# Patient Record
Sex: FEMALE | Race: WHITE | NOT HISPANIC OR LATINO | Employment: UNEMPLOYED | URBAN - METROPOLITAN AREA
[De-identification: names, ages, dates, MRNs, and addresses within clinical notes are randomized per-mention and may not be internally consistent; named-entity substitution may affect disease eponyms.]

---

## 2017-03-23 ENCOUNTER — HISTORICAL (OUTPATIENT)
Dept: RADIOLOGY | Facility: HOSPITAL | Age: 62
End: 2017-03-23

## 2017-06-14 ENCOUNTER — HISTORICAL (OUTPATIENT)
Dept: SPEECH THERAPY | Facility: HOSPITAL | Age: 62
End: 2017-06-14

## 2017-07-19 ENCOUNTER — HISTORICAL (OUTPATIENT)
Dept: SPEECH THERAPY | Facility: HOSPITAL | Age: 62
End: 2017-07-19

## 2017-09-12 ENCOUNTER — HISTORICAL (OUTPATIENT)
Dept: SPEECH THERAPY | Facility: HOSPITAL | Age: 62
End: 2017-09-12

## 2017-09-18 ENCOUNTER — HISTORICAL (OUTPATIENT)
Dept: ADMINISTRATIVE | Facility: HOSPITAL | Age: 62
End: 2017-09-18

## 2017-09-18 LAB
BUN SERPL-MCNC: 19 MG/DL (ref 7–18)
CALCIUM SERPL-MCNC: 9.8 MG/DL (ref 8.5–10.1)
CHLORIDE SERPL-SCNC: 103 MMOL/L (ref 98–107)
CO2 SERPL-SCNC: 33 MMOL/L (ref 21–32)
CREAT SERPL-MCNC: 0.6 MG/DL (ref 0.6–1.3)
ERYTHROCYTE [DISTWIDTH] IN BLOOD BY AUTOMATED COUNT: 13.6 % (ref 11.5–14.5)
GLUCOSE SERPL-MCNC: 93 MG/DL (ref 74–106)
HCT VFR BLD AUTO: 39.3 % (ref 35–46)
HGB BLD-MCNC: 12.4 GM/DL (ref 12–16)
MCH RBC QN AUTO: 28.1 PG (ref 26–34)
MCHC RBC AUTO-ENTMCNC: 31.6 GM/DL (ref 31–37)
MCV RBC AUTO: 89.1 FL (ref 80–100)
PLATELET # BLD AUTO: 257 X10(3)/MCL (ref 130–400)
PMV BLD AUTO: 12.5 FL (ref 7.4–10.4)
POTASSIUM SERPL-SCNC: 4.5 MMOL/L (ref 3.5–5.1)
RBC # BLD AUTO: 4.41 X10(6)/MCL (ref 4–5.2)
SODIUM SERPL-SCNC: 141 MMOL/L (ref 136–145)
T4 FREE SERPL-MCNC: 0.72 NG/DL (ref 0.76–1.46)
TSH SERPL-ACNC: 37 MIU/L (ref 0.36–3.74)
WBC # SPEC AUTO: 4.7 X10(3)/MCL (ref 4.5–11)

## 2017-09-25 ENCOUNTER — HISTORICAL (OUTPATIENT)
Dept: SURGERY | Facility: HOSPITAL | Age: 62
End: 2017-09-25

## 2017-10-24 ENCOUNTER — HISTORICAL (OUTPATIENT)
Dept: SPEECH THERAPY | Facility: HOSPITAL | Age: 62
End: 2017-10-24

## 2017-10-30 ENCOUNTER — HISTORICAL (OUTPATIENT)
Dept: ADMINISTRATIVE | Facility: HOSPITAL | Age: 62
End: 2017-10-30

## 2017-11-02 ENCOUNTER — HISTORICAL (OUTPATIENT)
Dept: LAB | Facility: HOSPITAL | Age: 62
End: 2017-11-02

## 2017-11-02 LAB
T3FREE SERPL-MCNC: 1.5 PG/ML (ref 2.18–3.98)
T4 FREE SERPL-MCNC: 0.57 NG/DL (ref 0.76–1.46)
TSH SERPL-ACNC: 90.7 MIU/L (ref 0.36–3.74)

## 2017-12-11 ENCOUNTER — HISTORICAL (OUTPATIENT)
Dept: SPEECH THERAPY | Facility: HOSPITAL | Age: 62
End: 2017-12-11

## 2017-12-15 ENCOUNTER — HISTORICAL (OUTPATIENT)
Dept: OTOLARYNGOLOGY | Facility: CLINIC | Age: 62
End: 2017-12-15

## 2017-12-15 LAB
T4 FREE SERPL-MCNC: 0.98 NG/DL (ref 0.76–1.46)
TSH SERPL-ACNC: 14.9 MIU/L (ref 0.36–3.74)

## 2018-01-03 ENCOUNTER — HISTORICAL (OUTPATIENT)
Dept: SPEECH THERAPY | Facility: HOSPITAL | Age: 63
End: 2018-01-03

## 2018-04-10 ENCOUNTER — HISTORICAL (OUTPATIENT)
Dept: ADMINISTRATIVE | Facility: HOSPITAL | Age: 63
End: 2018-04-10

## 2018-04-10 LAB
T4 FREE SERPL-MCNC: 0.86 NG/DL (ref 0.76–1.46)
TSH SERPL-ACNC: 18.9 MIU/L (ref 0.36–3.74)

## 2018-09-14 ENCOUNTER — HISTORICAL (OUTPATIENT)
Dept: ADMINISTRATIVE | Facility: HOSPITAL | Age: 63
End: 2018-09-14

## 2018-09-14 LAB
ALBUMIN SERPL-MCNC: 3.8 GM/DL (ref 3.4–5)
ALBUMIN/GLOB SERPL: 1 RATIO (ref 1–2)
ALP SERPL-CCNC: 86 UNIT/L (ref 45–117)
ALT SERPL-CCNC: 20 UNIT/L (ref 12–78)
AST SERPL-CCNC: 15 UNIT/L (ref 15–37)
BILIRUB SERPL-MCNC: 0.4 MG/DL (ref 0.2–1)
BILIRUBIN DIRECT+TOT PNL SERPL-MCNC: <0.1 MG/DL
BILIRUBIN DIRECT+TOT PNL SERPL-MCNC: ABNORMAL MG/DL
BUN SERPL-MCNC: 16 MG/DL (ref 7–18)
CALCIUM SERPL-MCNC: 8.8 MG/DL (ref 8.5–10.1)
CHLORIDE SERPL-SCNC: 104 MMOL/L (ref 98–107)
CO2 SERPL-SCNC: 34 MMOL/L (ref 21–32)
CREAT SERPL-MCNC: 0.6 MG/DL (ref 0.6–1.3)
ERYTHROCYTE [DISTWIDTH] IN BLOOD BY AUTOMATED COUNT: 12.4 % (ref 11.5–14.5)
GLOBULIN SER-MCNC: 4.7 GM/ML (ref 2.3–3.5)
GLUCOSE SERPL-MCNC: 87 MG/DL (ref 74–106)
HCT VFR BLD AUTO: 40.6 % (ref 35–46)
HGB BLD-MCNC: 13 GM/DL (ref 12–16)
MCH RBC QN AUTO: 30.2 PG (ref 26–34)
MCHC RBC AUTO-ENTMCNC: 32 GM/DL (ref 31–37)
MCV RBC AUTO: 94.2 FL (ref 80–100)
PLATELET # BLD AUTO: 255 X10(3)/MCL (ref 130–400)
PMV BLD AUTO: 12.2 FL (ref 7.4–10.4)
POTASSIUM SERPL-SCNC: 4.1 MMOL/L (ref 3.5–5.1)
PROT SERPL-MCNC: 8.5 GM/DL (ref 6.4–8.2)
RBC # BLD AUTO: 4.31 X10(6)/MCL (ref 4–5.2)
SODIUM SERPL-SCNC: 142 MMOL/L (ref 136–145)
T4 FREE SERPL-MCNC: 0.99 NG/DL (ref 0.76–1.46)
TSH SERPL-ACNC: 4.55 MIU/L (ref 0.36–3.74)
WBC # SPEC AUTO: 7.8 X10(3)/MCL (ref 4.5–11)

## 2018-09-21 ENCOUNTER — HISTORICAL (OUTPATIENT)
Dept: SURGERY | Facility: HOSPITAL | Age: 63
End: 2018-09-21

## 2018-11-20 ENCOUNTER — HISTORICAL (OUTPATIENT)
Dept: ADMINISTRATIVE | Facility: HOSPITAL | Age: 63
End: 2018-11-20

## 2018-11-20 LAB
T4 FREE SERPL-MCNC: 1.25 NG/DL (ref 0.76–1.46)
TSH SERPL-ACNC: 1.13 MIU/L (ref 0.36–3.74)

## 2019-09-24 ENCOUNTER — HISTORICAL (OUTPATIENT)
Dept: ADMINISTRATIVE | Facility: HOSPITAL | Age: 64
End: 2019-09-24

## 2019-09-24 LAB
ALBUMIN SERPL-MCNC: 3.7 GM/DL (ref 3.4–5)
ALBUMIN/GLOB SERPL: 0.8 RATIO (ref 1.1–2)
ALP SERPL-CCNC: 89 UNIT/L (ref 45–117)
ALT SERPL-CCNC: 20 UNIT/L (ref 12–78)
AST SERPL-CCNC: 12 UNIT/L (ref 15–37)
BILIRUB SERPL-MCNC: 0.6 MG/DL (ref 0.2–1)
BILIRUBIN DIRECT+TOT PNL SERPL-MCNC: 0.1 MG/DL (ref 0–0.2)
BILIRUBIN DIRECT+TOT PNL SERPL-MCNC: 0.5 MG/DL
BUN SERPL-MCNC: 16 MG/DL (ref 7–18)
CALCIUM SERPL-MCNC: 9.2 MG/DL (ref 8.5–10.1)
CHLORIDE SERPL-SCNC: 104 MMOL/L (ref 98–107)
CO2 SERPL-SCNC: 32 MMOL/L (ref 21–32)
CREAT SERPL-MCNC: 0.6 MG/DL (ref 0.6–1.3)
GLOBULIN SER-MCNC: 4.4 GM/ML (ref 2.3–3.5)
GLUCOSE SERPL-MCNC: 82 MG/DL (ref 74–106)
POTASSIUM SERPL-SCNC: 4 MMOL/L (ref 3.5–5.1)
PROT SERPL-MCNC: 8.1 GM/DL (ref 6.4–8.2)
SODIUM SERPL-SCNC: 140 MMOL/L (ref 136–145)
T4 FREE SERPL-MCNC: 1.29 NG/DL (ref 0.76–1.46)
TSH SERPL-ACNC: 0.02 MIU/L (ref 0.36–3.74)

## 2020-02-27 ENCOUNTER — HISTORICAL (OUTPATIENT)
Dept: OTOLARYNGOLOGY | Facility: CLINIC | Age: 65
End: 2020-02-27

## 2020-02-27 LAB
T4 FREE SERPL-MCNC: 1.25 NG/DL (ref 0.76–1.46)
TSH SERPL-ACNC: 0.05 MIU/L (ref 0.36–3.74)

## 2020-09-08 ENCOUNTER — HISTORICAL (OUTPATIENT)
Dept: ADMINISTRATIVE | Facility: HOSPITAL | Age: 65
End: 2020-09-08

## 2020-12-08 ENCOUNTER — HISTORICAL (OUTPATIENT)
Dept: ADMINISTRATIVE | Facility: HOSPITAL | Age: 65
End: 2020-12-08

## 2020-12-08 LAB
T4 FREE SERPL-MCNC: 0.91 NG/DL (ref 0.7–1.48)
TSH SERPL-ACNC: 7.26 UIU/ML (ref 0.35–4.94)

## 2021-04-23 ENCOUNTER — HISTORICAL (OUTPATIENT)
Dept: RADIOLOGY | Facility: HOSPITAL | Age: 66
End: 2021-04-23

## 2021-04-23 LAB — CREAT SERPL-MCNC: 0.73 MG/DL (ref 0.55–1.02)

## 2021-08-05 ENCOUNTER — HISTORICAL (OUTPATIENT)
Dept: ADMINISTRATIVE | Facility: HOSPITAL | Age: 66
End: 2021-08-05

## 2021-08-05 LAB
T4 FREE SERPL-MCNC: 1.07 NG/DL (ref 0.7–1.48)
TSH SERPL-ACNC: 0.14 UIU/ML (ref 0.35–4.94)

## 2022-04-01 ENCOUNTER — HISTORICAL (OUTPATIENT)
Dept: ADMINISTRATIVE | Facility: HOSPITAL | Age: 67
End: 2022-04-01

## 2022-04-01 ENCOUNTER — HISTORICAL (OUTPATIENT)
Dept: RADIOLOGY | Facility: HOSPITAL | Age: 67
End: 2022-04-01

## 2022-04-01 LAB — CREAT SERPL-MCNC: 0.75 MG/DL (ref 0.55–1.02)

## 2022-04-10 ENCOUNTER — HISTORICAL (OUTPATIENT)
Dept: ADMINISTRATIVE | Facility: HOSPITAL | Age: 67
End: 2022-04-10
Payer: MEDICAID

## 2022-04-28 VITALS
SYSTOLIC BLOOD PRESSURE: 111 MMHG | DIASTOLIC BLOOD PRESSURE: 70 MMHG | BODY MASS INDEX: 18 KG/M2 | WEIGHT: 112 LBS | HEIGHT: 66 IN | OXYGEN SATURATION: 100 %

## 2022-04-30 NOTE — OP NOTE
Date of surgery: 9/25/17    Pre-Op Diagnosis: left true vocal cord paralysis  Post-Op Diagnosis: same    Procedure: Direct Suspension Microlaryngoscopy, Left True Vocal Cord Injection Laryngoplasty    Anesthesia: General Endotracheal    Surgeon: Luis Johnson MD  Assistants: None  Attending: Ivett James MD    Indications for procedure: [id number] of the oral tongue s/p CRT 9/2012. With FOM recurrence s/p excision of floor of mouth, neck dissection, pec flap, with sacrifice of carotid artery and lower cranial nerves, including X, XI, and XII on 5/15/2017. She had reconstruction of the carotid with carotid to vertebral bypass with Dr. Berrios. Found to have left TVF paralysis on exam. After risks, benefits and alternatives to surgery were explained the patient elected to proceed with surgery. Informed consent was obtained in clinic and reviewed on the day of surgery.    Findings: Lateralized true vocal fold prior to injection; mildly-moderately medialized after injection    Procedure in Detail:   After informed consent was obtained and verified, the patient was brought to the operating suite and placed on the table in supine position. After adequate general anesthesia was achieved with endotracheal intubation with a laser-safe endotracheal tube, time out was performed. Patient was prepped and draped in clean fashion. Head of bed was rotated 90 degrees. Three folded damp gauzes were placed to protect upper alveolus. The Dedo laryngoscope was used to examine the upper aerodigestive tract. Oropharynx appeared within normal limits. The epiglottis and tongue base were examined and within normal limits. The scope was maneuvered to visualize the supraglottis and glottis, and the patient was placed into suspension on the Mustard stand. The left true vocal cord was visualized with the assistance of the 0- and 30-degree telescope, found to be in a lateralized position. Using the 0-degree telescope to visualized the  larynx, the Prolaryn voice gel was injected into the left vocal ligament near the posterior aspect of the vocalis muscle, with care to stay deep to the lamina propria. Medialization of the true vocal cord was visualized.     Photodocumentation was obtained of the following findings: glottis before and after injection    The alveolar gauze was removed and dentition was inspected and noted to be stable. All instruments were removed from the patient, all counts were correct, and the patient was returned to the care of anesthesia for reversal in the operating room and sent to recovery room in satisfactory condition. No identifiable bleeding at the termination of procedure. The patient was returned to anesthesia for reversal and sent to recovery room in good condition. All counts were correct.     EBL: 00 cc    Specimens: none    Drains: None    Complications: None

## 2022-05-04 NOTE — HISTORICAL [facility] CERNER
This is a historical note converted from [place]. Formatting and pictures may have been removed.  Please reference [facility] for original formatting and attached multimedia. Chief Complaint  F/U APPT CANCER OF ORAL CAVITY; S/P LT RADICAL NECK DISSECTION  History of Present Illness  58y with [id number] scca OC s/p XRT (Oct 2012) and was not interested in large resection at that time.  doing ok, she is tolerating PO liquids and drinks 6-8 cans of ensure per day. Her weight has been stable at 113-116lbs since July. She is concerned with the appearance of her teeth and has discussed dental extractions with her radiation oncologist but has had no tooth pain, swelling, or bleeding. She notes no new lumps or bumps, no tongue or oral cavity pain, ulceration, or bleeding.  ????????  July 29, 2014: Still tolerating PO. Gained weight since last visit. Drinking ensure, ice cream, and soup. No dyspnea. No new lumps/bumps  ????????  9/16/14: Still tolerating PO.  ????????  October 28, 2014: Still tolerating liquids PO. No complaints. Weight is stable. Wants residual mandibular dentiition extracted but is going to talk to radiation oncologist first. Discussed the risk of ORN with her and she understands the need for pre and post procedure hyperbaric oxygen. She will contact us if wanting to proceed with extractions.  ?   03/31/2015: Reports?2 weeks of left mandibular pain.? Otherwise, tolerating ensure, weight stable, no new lesions to neck.  ?   4-14-15: Here s/p biopsy in clinic. Feeling less pain than before bx. No bleeding.  Never got CT done bc not called.  ?   4-30-15: Here for CT results-- shows submandibular masses c/w metastatic disease.  Long talk with pt about next steps to take... she is not interested in pursuing anything at this time.  No changes in swallowing, speech. I feel fine.  ?   5/28/15: Returns today in follow up. Has history of [id number] SCCa or the oral cavity and underwent XRT as she refused surgery. Finished in  October of 2012. Had been following up with Dr. Ramirez. Recently noted to have bilateral submadibular nodes. CT concerning for residual disease or mets. She has been refusing any further intervention.  Has been very depressed lately as sister passed away. She admits some denial and is ready to move on with FNA. She otherwise reports no new issues.  ?  6/18/15: Returns today in follow up. Had FNA with Dr. Starr last week and here to discuss results. Reports no new significant issues.  7/2/15: feeling ok. Has a lot of questions about her disease.  7/14/15: s/p CT head,?neck, chest, abdomen. No new issues.  7/28/15: Here today for follow up. She has consultation with Dr. Heard regarding biopsy of lung versus hip lesion on Friday. No other issues.  8/11:15: No new issues, no new lumps/bumps. States she notices her neck lymphadenopathy more now - is a source of discomfort. Otherwise, USOH.  9/3/15: no new issues. floor of mouth still sore after biopsies. States her neck LAD is about the same size as last visit.  ?  04/13/2017: Since last visit in 2015 (at which time +biopsy of FOM,?B LAD), patient has refused any treatment options. Did not pursue treatment with chemo or radiation. Now, with severe left neck pain radiating to face. No dysphagia, odynophagia, voice changes. New larger left neck mass.  ?  6/1/17: Had advanced T4 squamous cell carcinoma in the distant past and refused surgery, instead getting XRT. Presented with persistent disease and most recently underwent excision of floor of mouth, neck dissection, pec flap, with sacrifice of carotid artery and lower cranial nerves, including X, XI, and XII on 5/15/2017. She had reconstruction of the carotid with carotid to vertebral bypass with Dr. Berrios. She is here for her first postoperative visit.  ?  06/08/2017: Complains of PEG ache, worse for 5 days. Using PEG without difficulty. Not performing wound care to neck  ?  6/15/17: Doing well. Performing  WtD dressing changes. Healing well. No drainage. Worried that she irritated her SG donor site. Using PEG and supplementation. Has gained 2-lbs since last visit.  ?  7/6/17: Doing well. Neck wound has almost completely healed. ?Planning for additional XRT with Dr. Ramirez. Tolerating PEG feeds. Pain controlled. No new lumps/bumps.  ?  8/10/17: Doing well. Has 6 XRT treatments left, started 7/10. No issues. Weight stable. Seeing Shonda.  ?  9/12/17: Here for follow up of [id number] of the oral tongue s/p CRT 9/2012. With FOM recurrence s/p excision of floor of mouth, neck dissection, pec flap, with sacrifice of carotid artery and lower cranial nerves, including X, XI, and XII on 5/15/2017. She had reconstruction of the carotid with carotid to vertebral bypass with Dr. Berrios. Finished XRT to neck 8/21/17. No new lesions/masses. Weight is down but seeing nutrition. Taking all intake in PEG. Weight 92 lbs down from 101. Nutrition appt Thursday  Review of Systems  Constitutional_negative  Eye_no vision changes  ENMT see HPI  Respiratory negative  Cardiovascular negative  Gastrointestinal negative  Hema/Lymph_negative  Endocrine negative  Immunologic negative  Musculoskeletal negative  Integumentary negative  Neurologic negative  All Other ROS_ negative?  ?  Physical Exam  ???Vitals & Measurements  ??T:?97.2? ?F ?(Oral)? HR:?81?(Peripheral)? RR:?20? BP:?129/79?  ??HT:?168?cm? HT:?168?cm? WT:?42?kg? WT:?42?kg? BMI:?14.88?  General - no?acute distress, alert/oriented, very thin and cachectic appearing?(BMI 14)  Voice -?moderate dysphonia  Eye - extraocular movements intact bilaterally, pupils equal round and reactive to light and accommodation  Head - normocephalic, atraumatic  Ears - AD: normal external ear, EAC normal, TM normal, no middle ear effusion, no retraction  ?????????AS: normal external ear, EAC normal, TM normal, no middle ear effusion, no retraction  ?????????Grossly normal hearing  Nose - bilateral nares  patent, no nasal septal deviation, no inferior turbinate hypertrophy, no masses/lesions apparent  Oral cavity/oropharynx - mucosal membranes moist, tonsils within normal limits, floor of mouth soft and nontender, STSG well healed, no lesions or masses, tongue with minimal mobility, XRT changes  Neck - supple, nontender, no cervical?lymphadenopathy, normal range of motion, significant XRT fibrosis, pec flap site well healed  Cardiovascular - regular rate, normal rhythm; carotid pulses palpable bilaterally  Musculoskeletal - no peripheral weakness  Respiratory - nonlabored respirations  Integumentary - no obvious skin lesions, right thigh and groin lesions well healed  Neurologic - cranial nerves II to XII grossly intact bilaterally?  ?  Flexible Laryngoscopy: Topical tetracaine and phenylephrine applied to nasal cavities. Scope entered in right nare. No masses or lesions in the nasal cavity or nasopharynx. BOT/vallecula with normal lingual tonsils, no mass or lesion, no asymmetry. Epiglottis, AE folds, false vocal cords, arytenoids with no lesions or mass. Interarytenoid area without significant edema and erythema. Post cricoid region/piriforms with no mass or lesion. True vocal cords- right with normal mobility, left in paramedian position, +glottic gap  Assessment/Plan  ?  [id number] of the oral tongue s/p CRT 9/2012. With FOM recurrence s/p excision of floor of mouth, neck dissection, pec flap, with sacrifice of carotid artery and lower cranial nerves, including X, XI, and XII on 5/15/2017. She had reconstruction of the carotid with carotid to vertebral bypass with Dr. Berrios.  ?  Interested in L TVC injection. Will plan on 9/25/17 assuming it is ok for her to stop ASA prior to procedure. Will get general medical clearance  Continue follow up with nutrition  Continue follow up with ST  NPO, continue PEG tube feeds  Continue follow up with XRT as scheduled  RTC in 4 weeks Problem List/Past Medical  History  ??Ongoing  ??Anxiety  ??Head and neck cancer  ??Hx-oral/pharynx malg NEC(  Confirmed  )  ??Leg cramps  ??Lymphadenopathy, submandibular  ??Sleep difficulties  ??Tobacco user(  Probable Diagnosis  )  ?Historical  ???Oral cancer  ???Tongue cancer  Procedure/Surgical History  ??Bronchoscopy, Diagnostic (08/24/2015)  ??Bronchoscopy, rigid or flexible, including fluoroscopic guidance, when performed; diagnostic, with cell washing, when performed (separate procedure) (08/24/2015)  ??Closed [needle] biopsy of tongue (08/24/2015)  ??Laryngoscopy (None) (08/24/2015)  ??Laryngoscopy, direct, operative, with biopsy; (08/24/2015)  ??Other bronchoscopy (08/24/2015)  ??Radical neck dissection  Medications  ??aspirin 81 mg oral tablet, 81 mg= 1 tab(s), Oral, Daily  ??Fish Oil 1200 mg oral capsule, 1200 mg= 1 cap(s), Oral, TID,? ?Not taking  ??gabapentin 300 mg oral capsule, See Instructions,? ?Not taking  ??Hycet 7.5 mg-325 mg/15 mL oral solution, 15 mL, Oral, q6hr, PRN,? ?Not taking  ??levothyroxine 25 mcg (0.025 mg) oral tablet, 25 mcg= 1 tab(s), Oral, Daily, 3 refills  ??Lexapro 10 mg oral tablet, 10 mg= 1 tab(s), Oral, Daily,? ?Not taking  ??omeprazole 20 mg oral DR capsule, 20 mg= 1 cap(s), Oral, Daily, 6 refills  Allergies  No Known Allergies  Social History  ??Alcohol - 09/22/2015  ???Current  ???Current  ??Employment/School - No Risk, 09/22/2015  ???disability  ??Exercise - Regular exercise, 09/22/2015  ???Exercise frequency: Daily.  ??Home/Environment - No Risk, 09/22/2015  ???Lives with Alone. Living situation: Home/Independent.  ??Nutrition/Health - Low Risk, 09/22/2015  ???Ensure  ??Sexual - No Sexual Activity, 09/22/2015  ??Substance Abuse - No Risk, 09/22/2015  ???Past  ??Tobacco - 09/22/2015  ???Former smoker, Cigarettes  ???Current every day smoker  Family History  ??Alcoholism.: Sister.   ?  [1]?ENT Clinic Note; Nas FRANCISCO, Joyce KIMBLE 08/10/2017 15:49 CDT  Addendum by Erika FRANCISCO, Ivett Brar on  September 12, 2017 13:46:45 CDT (Verified)  The patients history, past history and exam were discussed with the resident while the patient was in clinic and I agree with the proposed assessment and management plan. [1]  ?  ?  9/25/17: Here for surgery. No changes to H&P above. Stopped ASA for last 5 days.  [1]?Office Visit Note; Venkatesh FRANCISCO, Pallavi KERN 09/12/2017 11:46 CDT

## 2022-05-04 NOTE — HISTORICAL [facility] CERNER
This is a historical note converted from [place]. Formatting and pictures may have been removed.  Please reference [place] for original formatting and attached multimedia. 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  doing ok, she is tolerating PO liquids and drinks 6-8 cans of ensure per day. Her weight has been stable at 113-116lbs since July. She is concerned with the appearance of her teeth and has discussed dental extractions with her radiation oncologist but has had no tooth pain, swelling, or bleeding. She notes no new lumps or bumps, no tongue or oral cavity pain, ulceration, or bleeding.  ????????  July 29, 2014: Still tolerating PO. Gained weight since last visit. Drinking ensure, ice cream, and soup. No dyspnea. No new lumps/bumps  ????????  9/16/14: Still tolerating PO.  ????????  October 28, 2014: Still tolerating liquids PO. No complaints. Weight is stable. Wants residual mandibular dentiition extracted but is going to talk to radiation oncologist first. Discussed the risk of ORN with her and she understands the need for pre and post procedure hyperbaric oxygen. She will contact us if wanting to proceed with extractions.  ?   03/31/2015: Reports?2 weeks of left mandibular pain.? Otherwise, tolerating ensure, weight stable, no new lesions to neck.  ?   4-14-15: Here s/p biopsy in clinic. Feeling less pain than before bx. No bleeding.  Never got CT done bc not called.  ?   4-30-15: Here for CT results-- shows submandibular masses c/w metastatic disease.  Long talk with pt about next steps to take... she is not interested in pursuing anything at this time.  No changes in swallowing, speech. I feel fine.  ?   5/28/15: Returns today in follow up. Has history of [id number] SCCa or the oral cavity and underwent XRT as she refused surgery. Finished in October of 2012. Had been following  up with Dr. Ramirez. Recently noted to have bilateral submadibular nodes. CT concerning for residual disease or mets. She has been refusing any further intervention.  Has been very depressed lately as sister passed away. She admits some denial and is ready to move on with FNA. She otherwise reports no new issues.  ?   6/18/15: Returns today in follow up. Had FNA with Dr. Starr last week and here to discuss results. Reports no new significant issues.  7/2/15: feeling ok. Has a lot of questions about her disease.  7/14/15: s/p CT head,?neck, chest, abdomen. No new issues.  7/28/15: Here today for follow up. She has consultation with Dr. Heard regarding biopsy of lung versus hip lesion on Friday. No other issues.  8/11:15: No new issues, no new lumps/bumps. States she notices her neck lymphadenopathy more now - is a source of discomfort. Otherwise, USOH.  9/3/15: no new issues. floor of mouth still sore after biopsies. States her neck LAD is about the same size as last visit.  ?   04/13/2017: Since last visit in 2015 (at which time +biopsy of FOM,?B LAD), patient has refused any treatment options. Did not pursue treatment with chemo or radiation. Now, with severe left neck pain radiating to face. No dysphagia, odynophagia, voice changes. New larger left neck mass.  ?   6/1/17: Had advanced T4 squamous cell carcinoma in the distant past and refused surgery, instead getting XRT. Presented with persistent disease and most recently underwent excision of floor of mouth, neck dissection, pec flap, with sacrifice of carotid artery and lower cranial nerves, including X, XI, and XII on 5/15/2017. She had reconstruction of the carotid with carotid to vertebral bypass with Dr. Berrios. She is here for her first postoperative visit.  ?  06/08/2017: Complains of PEG ache, worse for 5 days. Using PEG without difficulty. Not performing wound care to neck  ?  6/15/17: Doing well. Performing WtD dressing changes. Healing well.  No drainage. Worried that she irritated her SG donor site. Using PEG and supplementation. Has gained 2-lbs since last visit.  ?  7/6/17: Doing well. Neck wound has almost completely healed. ?Planning for additional XRT with Dr. Ramirez. Tolerating PEG feeds. Pain controlled. No new lumps/bumps.  ?  8/10/17: Doing well. Has 6 XRT treatments left, started 7/10. No issues. Weight stable. Seeing Shonda.  ?  9/12/17: Here for follow up of [id number] of the oral tongue s/p CRT 9/2012. With FOM recurrence s/p excision of floor of mouth, neck dissection, pec flap, with sacrifice of carotid artery and lower cranial nerves, including X, XI, and XII on 5/15/2017. She had reconstruction of the carotid with carotid to vertebral bypass with Dr. Berrios. Finished XRT to neck 8/21/17. No new lesions/masses. Weight is down but seeing nutrition. Taking all intake in PEG. Weight 92 lbs down from 101. Nutrition appt Thursday [1]  ?  10/11/17: s/p L TVC injection laryngoplasty. patient states that she feels like her voice has had some improvment. still having problems with swallowing- states she is having difficulty initiating swallowing. Saw Shonda about 3 weeks ago she thinks and does not have another appointment.  weight stable. no lumps or bumps/changes in dysphagia/otalgia.  c/o central blurry vision in R eye which she noticed several days after surgery [1]  ?  11/8/17: having difficulty gaining weight, but not losing. working with nutritionist. seeing Shonda with ST; did not get esophagram 2/2 aspiration risk. Feels like she has had some improvement in voice, but it comes and goes. no new lumps or bumps. no otalgia/odynophagia.  ?  12/20/17: Here today for follow up. No complaints, keeping weight ok. Saw XRT recently - doing well from there perspective, next appt in 4 months.  Last visit TSH was 90, synthroid increased to 75 mcg, TSH now 14, FT4 is 0.98. Denies any new lumps or bumps, no pain. Swallowing slightly improved with ST  help at this time.  ?  1/31/18: Doing well. Denies otalgia, dyspnea, voice changes, new masses/lesions. Weight stable/increased (gained 8-lbs). PEG dependent, working with ST?on voice/oral feeding-- passed honey thickened liquids in 9/2017. C/o pain at PEG site-- stabbing, sharp, constant. Seen by [facility] this month-- [facility]SU[facility].  ?  3/14/18: Here for routine surveillance. Feels good. No dysphagia, odynophagia, otalgia. No weight loss. No concerns for new lumps/bumps. Gaining weight. Working with ST. Doing 100% via PEG.  [1]  ?  4/10/18: No new complaints, Still PEG dependent, unable to take PO due to dysphagia.? No SOB.? Weight stable.? No new masses.? No fatigue.? No new pain.  ?  6/14/18: Here for routine surveillance. Feels good. No new dysphagia, odynophagia, otalgia. No weight loss. No concerns for new lumps/bumps. No PO intake, PEG dependant.  ?  7/26/18: Here for routine surveillance. Says she had left sided jaw pain for 2 weeks recently and then it just went away. No lesions or ulcers. Felt deeper and bone pain to her. Has been having issues with her left arm and shoulder since surgery, frustrated she is unable to throw with her great nieces. Has been trying swimming.  ?  9/6/18: Here today for cancer surveillance and to discuss VC injection on L. Patient is open to repeat injection to help voice/breathiness. No new complaints- denies otalgia, weight loss, new nodes. Still using PEG exclusively.  ?  9/21/18: Pt here for repeat VC injection.? No changes to health.  ?  10/9/18: Here today post-op from second L TVC injection. States she feels that her voice is better post-op with less breathiness. She is still using PEG tube, NPO. No new issues.  ?  11/20/18: Here for surveillance. Doing very well. No new issues. Feels her voice is stable. Still using PEG primarily. No new lumps/bumps. No oral lesions or bleeding  ?  1/2/19: Here for surveillance check. No ENT issues. Feels voice is stable. Has had some difficulty  with PEG tube (fell out around Thanksgiving and had to be replaced in ED). Has been loose and area around tube has been irritated. [1]  ?  2/14/19: No changes since last visit other than recent fever blister. PEG tube working well. Denies otalgia, dyspnea, voice changes, new masses/lesions. Weight stable. Needs refills on synthroid. [1]  ?  3/28/19: h/o [id number] SCC oral tongue s/p CRT 2012. Recurrent in FOM s/p WLE/ L MRND with sacrifice of carotid CN X, XI, XII, pmmf, vertebral to carotid bypass with Dr. Berrios.  using peg for all nutrition. asking if shell ever swallow again. has not seen dora in 1 year but states she is still doing exercises. no?voice changes. states shes had 2 injections and neither of them helped. denies otalgia/throat pain, new lesions or oral pain.  ?  5/30/19: Here for cancer surveillance. Still with significant dysphagia. PEG dependent. Denies dyspnea. Stable dysphonia.  ?  7/30/2019: Dysphonia stable. Denies any new issues. Dysphagia stable and has seen speech who gave her exercises. remains PEG dependent  ?  9/24/19: Here for surveillance. Has not gotten her yearly labs and chest xray - will order today. No new issues. Still hates her PEG but did not get any benefit with swallow form injections of cord so not interested in further intervention.  ?  12/19/19: No c/o today. Needs synthroid refill.  ?  2/27/20: No complaints today. No changes in voice, weight, lumps/bumps. Everything stable. Still hates PEG. Still not wanting any further vocal fold injections.  ?  6/9/20: Here today for follow-up. Voice stable, weight stable, no new otalgia, pain, lumps or bumps in the head or neck. Doing all intake via PEG. Not interested in further vocal fold injections at this time.?Doing well overall, no complaints. Needs refill on synthroid. [1]  ?  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 Patient had developed a recurrence and had undergone resection of the floor of mouth with wide local excision and reconstruction with a left pectoralis major myocutaneous flap in conjunction with a left modified radical neck dissection with sacrifice of the left carotid artery (vertebral bypass) and resection of cranial nerves X, XI, and XII in May 2017.? 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  Patient also is PEG dependent and takes all her nutrition through her PEG.? She also has hypothyroidism.? She has no complaints today.? She has no sore throat, difficulty breathing, or change in weight.? No complaints of any ear pain and no noticeable swelling of the neck.  Review of Systems  Unremarkable except as mentioned above.  Physical Exam  Vitals & Measurements  T:?36.4? ?C (Axillary)? HR:?80(Peripheral)? RR:?18? BP:?132/67?  HT:?162.00?cm? WT:?48.200?kg? BMI:?18.37?  General: Thin?female in no acute distress.? Voice is breathy.  Head and face: Normocephalic. ?No facial lesions. ?No temporomandibular joint tenderness or click.  Ears: Auricles are developed normally bilaterally. ?Both external auditory canals are clear. ?No evidence of otitis externa. ?No cerumen impaction. ?Tympanic membranes are nonerythematous. ?No middle ear effusions.  Nose: Nasal dorsum is unremarkable. ?She does have left septal deviation. ?Mild intranasal congestion with clear drainage.  Oral cavity and oropharynx:?The patient does not have any?recurrent lesions involving the?floor of mouth or tongue region.? Buccal mucosa is unremarkable. ?No lesions of the alveolar ridge?inferiorly. ?She does have an upper?denture in place. ?Palate is without lesions.? No posterior pharyngeal erythema or exudates. ?No masses.  Larynx and hypopharynx: Fiberoptic laryngoscopy was carried out in the clinic after  informed consent was obtained. ?Both nostrils were sprayed with 1% phenylephrine nasal spray and 4% tetracaine. ?Laryngoscope was then then introduced into the nasal cavity via the right nostril. ?Examination of the nasal cavity showed left septal deviation with mild intranasal congestion and clear secretions. ?Examination of the nasopharynx showed no nasopharyngeal masses. ?Eustachian tubes or unobstructed. ?Examination of the larynx and hypopharynx showed normal mobility involving the right true vocal cord. ?The?left true vocal cord is immobile and in a paramedian position. ?Upon phonation the patient does have incomplete glottic closure.? No hypopharyngeal or laryngeal lesions.? No masses or lesions in the area of the?base of tongue.  Neck: Supple without palpable?adenopathy. ?No thyromegaly. ?Trachea is in the midline. ?Patient does have?fibrotic changes in the neck secondary to her prior surgery and radiation.  Eyes: Extraocular muscles are intact bilaterally. ?No exophthalmos or enophthalmos.  Neurologic: Alert?and oriented x3.? Cranial nerves II through XII are intact except as?noted above.  ?  Lab:Her most recent thyroid function studies were from February 27, 2020; her TSH was low at 0.050 and her free T4 was normal at 1.25.? Her last chest x-ray was 1 year ago.  Assessment/Plan  [id number] squamous cell carcinoma of the oral cavity-no evident disease.  Iatrogenic left true vocal cord paralysis with hoarseness which had responded to vocal cord injections previously.  Hypothyroidism-overtreated.  ?  Plan:  Did discuss with the patient consideration of a thyroplasty for additional treatment of her hoarseness.? I did discuss with her that this would be a more permanent solution as compared to her vocal cord injections and she would like to give some consideration to this.? Referral to Dr. Anne.  Reduce levothyroxine to 75 mcg p.o. daily.  Order chest x-ray today.  Follow-up here in 3 months with pre-clinic TSH  and free T4.? Patient will need negative pressure room and will have laryngoscopy done at that time.   Problem List/Past Medical History  Ongoing  Dysphagia  Dysphagia  Head and neck cancer  Hx-oral/pharynx malg NEC(  Confirmed  )  Leg cramps  Lymphadenopathy, submandibular  Malignant tumor of anterior two-thirds of tongue  Morbid obesity  Oral cancer  Secondary malignant neoplasm of neck  Sleep difficulties  Tobacco user(  Probable Diagnosis  )  Tongue cancer  Vocal cord paralysis  Vocal cord paralysis  Historical  Anxiety  Procedure/Surgical History  Introduction of Other Therapeutic Substance into Respiratory Tract, Via Natural or Artificial Opening Endoscopic (09/21/2018)  Laryngoscopy, direct, with injection into vocal cord(s), therapeutic; with operating microscope or telescope (09/21/2018)  Microlaryngoscopy (None) (09/21/2018)  Introduction of Other Therapeutic Substance into Respiratory Tract, Via Natural or Artificial Opening Endoscopic (09/25/2017)  Laryngoscopy (None) (09/25/2017)  Laryngoscopy, direct, with injection into vocal cord(s), therapeutic; with operating microscope or telescope (09/25/2017)  Vocal Cord Injection (Left) (09/25/2017)  Bronchoscopy, Diagnostic (08/24/2015)  Bronchoscopy, rigid or flexible, including fluoroscopic guidance, when performed; diagnostic, with cell washing, when performed (separate procedure) (08/24/2015)  Closed [needle] biopsy of tongue (08/24/2015)  Laryngoscopy (None) (08/24/2015)  Laryngoscopy, direct, operative, with biopsy; (08/24/2015)  Other bronchoscopy (08/24/2015)  Radical neck dissection   Medications  acyclovir 400 mg oral tablet, 400 mg= 1 tab(s), Oral, TID,? ?Not Taking, Completed Rx  aspirin 81 mg oral tablet, 81 mg= 1 tab(s), Oral, Daily  Ensure Live, See Instructions, 12 refills  FLUARIX QUAD 3208-1455 SYRINGE,? ?Not taking  Jevity 1.2, See Instructions, 12 refills  levothyroxine 88 mcg (0.088 mg) oral tablet, 88 mcg= 1 tab(s), Oral, Daily, 2  refills  Allergies  No Known Allergies  Social History  Abuse/Neglect  No, No, Yes, 09/08/2020  Alcohol  Past, 02/27/2020  Employment/School - No Risk, 09/22/2015  disability, 02/28/2015  Exercise - Regular exercise, 09/22/2015  Exercise frequency: Daily., 02/28/2015  Home/Environment - No Risk, 09/22/2015  Lives with Alone. Living situation: Home/Independent., 02/28/2015  Nutrition/Health - Low Risk, 09/22/2015  Ensure, 02/28/2015  Sexual - No Sexual Activity, 02/28/2015  Sexually active: No., 12/19/2019  Sexually active: No. Sexual orientation: Lesbian, serra or homosexual. Gender Identity Identifies as female., 02/14/2019  Spiritual/Cultural  Spiritism, 09/24/2019  Substance Use - No Risk, 09/22/2015  Past, Marijuana, 01/03/2019  Tobacco  Former smoker, quit more than 30 days ago, Cigarettes, No, 09/08/2020  Family History  Alcoholism.: Sister.  Health Maintenance  Health Maintenance  ???Pending?(in the next year)  ??? ??OverDue  ??? ? ? ?Aspirin Therapy for CVD Prevention due??06/01/18??and every 1??year(s)  ??? ? ? ?Smoking Cessation due??01/01/20??and every 1??year(s)  ??? ? ? ?Alcohol Misuse Screening due??01/02/20??and every 1??year(s)  ??? ??Due?  ??? ? ? ?Breast Cancer Screening due??09/08/20??and every?  ??? ? ? ?Cervical Cancer Screening due??09/08/20??and every?  ??? ? ? ?Colorectal Screening due??09/08/20??and every?  ??? ? ? ?Influenza Vaccine due??09/08/20??and every?  ??? ? ? ?Lipid Screening due??09/08/20??and every?  ??? ? ? ?Lung Cancer Screening due??09/08/20??and every 1??year(s)  ??? ? ? ?Tetanus Vaccine due??09/08/20??and every 10??year(s)  ??? ? ? ?Zoster Vaccine due??09/08/20??and every?  ??? ??Due In Future?  ??? ? ? ?Depression Screening not due until??12/18/20??and every 1??year(s)  ??? ? ? ?Obesity Screening not due until??01/01/21??and every 1??year(s)  ??? ? ? ?ADL Screening not due until??02/27/21??and every 1??year(s)  ???Satisfied?(in the past 1 year)  ??? ??Satisfied?  ??? ? ? ?ADL  Screening on??02/27/20.??Satisfied by Giselle Mac LPN  ??? ? ? ?Blood Pressure Screening on??09/08/20.??Satisfied by Aleyda Austin LPN  ??? ? ? ?Body Mass Index Check on??09/08/20.??Satisfied by Aleyda Austin LPN  ??? ? ? ?Depression Screening on??12/19/19.??Satisfied by Kareen Birch LPN  ??? ? ? ?Diabetes Screening on??09/24/19.??Satisfied by Cortney Alegria  ??? ? ? ?Obesity Screening on??09/08/20.??Satisfied by Aleyda Austin LPN  ?     [1]?ENT Clinic Note; Lillie Rios MD 06/09/2020 10:49 CDT

## 2022-05-04 NOTE — HISTORICAL [facility] CERNER
This is a historical note converted from [place]. Formatting and pictures may have been removed.  Please reference [place] for original formatting and attached multimedia. Chief Complaint  F/U for head and neck Cancer.  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  doing ok, she is tolerating PO liquids and drinks 6-8 cans of ensure per day. Her weight has been stable at 113-116lbs since July. She is concerned with the appearance of her teeth and has discussed dental extractions with her radiation oncologist but has had no tooth pain, swelling, or bleeding. She notes no new lumps or bumps, no tongue or oral cavity pain, ulceration, or bleeding.  ????????  July 29, 2014: Still tolerating PO. Gained weight since last visit. Drinking ensure, ice cream, and soup. No dyspnea. No new lumps/bumps  ????????  9/16/14: Still tolerating PO.  ????????  October 28, 2014: Still tolerating liquids PO. No complaints. Weight is stable. Wants residual mandibular dentiition extracted but is going to talk to radiation oncologist first. Discussed the risk of ORN with her and she understands the need for pre and post procedure hyperbaric oxygen. She will contact us if wanting to proceed with extractions.  ?   03/31/2015: Reports?2 weeks of left mandibular pain.? Otherwise, tolerating ensure, weight stable, no new lesions to neck.  ?   4-14-15: Here s/p biopsy in clinic. Feeling less pain than before bx. No bleeding.  Never got CT done bc not called.  ?   4-30-15: Here for CT results-- shows submandibular masses c/w metastatic disease.  Long talk with pt about next steps to take... she is not interested in pursuing anything at this time.  No changes in swallowing, speech. I feel fine.  ?   5/28/15: Returns today in follow up. Has history of [id number] SCCa or the oral cavity and underwent XRT as she refused surgery. Finished in October of 2012. Had been  following up with Dr. Ramirez. Recently noted to have bilateral submadibular nodes. CT concerning for residual disease or mets. She has been refusing any further intervention.  Has been very depressed lately as sister passed away. She admits some denial and is ready to move on with FNA. She otherwise reports no new issues.  ?  6/18/15: Returns today in follow up. Had FNA with Dr. Starr last week and here to discuss results. Reports no new significant issues.  7/2/15: feeling ok. Has a lot of questions about her disease.  7/14/15: s/p CT head,?neck, chest, abdomen. No new issues.  7/28/15: Here today for follow up. She has consultation with Dr. Heard regarding biopsy of lung versus hip lesion on Friday. No other issues.  8/11:15: No new issues, no new lumps/bumps. States she notices her neck lymphadenopathy more now - is a source of discomfort. Otherwise, USOH.  9/3/15: no new issues. floor of mouth still sore after biopsies. States her neck LAD is about the same size as last visit.  ?  04/13/2017: Since last visit in 2015 (at which time +biopsy of FOM,?B LAD), patient has refused any treatment options. Did not pursue treatment with chemo or radiation. Now, with severe left neck pain radiating to face. No dysphagia, odynophagia, voice changes. New larger left neck mass.  ?  6/1/17: Had advanced T4 squamous cell carcinoma in the distant past and refused surgery, instead getting XRT. Presented with persistent disease and most recently underwent excision of floor of mouth, neck dissection, pec flap, with sacrifice of carotid artery and lower cranial nerves, including X, XI, and XII on 5/15/2017. She had reconstruction of the carotid with carotid to vertebral bypass with Dr. Berrios. She is here for her first postoperative visit.  ?  06/08/2017: Complains of PEG ache, worse for 5 days. Using PEG without difficulty. Not performing wound care to neck  ?  6/15/17: Doing well. Performing WtD dressing changes.  Healing well. No drainage. Worried that she irritated her SG donor site. Using PEG and supplementation. Has gained 2-lbs since last visit.  ?  7/6/17: Doing well. Neck wound has almost completely healed. ?Planning for additional XRT with Dr. Ramirez. Tolerating PEG feeds. Pain controlled. No new lumps/bumps.  ?  8/10/17: Doing well. Has 6 XRT treatments left, started 7/10. No issues. Weight stable. Seeing Shonda.  ?  9/12/17: Here for follow up of [id number] of the oral tongue s/p CRT 9/2012. With FOM recurrence s/p excision of floor of mouth, neck dissection, pec flap, with sacrifice of carotid artery and lower cranial nerves, including X, XI, and XII on 5/15/2017. She had reconstruction of the carotid with carotid to vertebral bypass with Dr. Berrios. Finished XRT to neck 8/21/17. No new lesions/masses. Weight is down but seeing nutrition. Taking all intake in PEG. Weight 92 lbs down from 101. Nutrition appt Thursday [1]  ?  10/11/17: s/p L TVC injection laryngoplasty. patient states that she feels like her voice has had some improvment. still having problems with swallowing- states she is having difficulty initiating swallowing. Saw Shonda about 3 weeks ago she thinks and does not have another appointment.  weight stable. no lumps or bumps/changes in dysphagia/otalgia.  c/o central blurry vision in R eye which she noticed several days after surgery [1]  ?  11/8/17: having difficulty gaining weight, but not losing. working with nutritionist. seeing Shonda with ST; did not get esophagram 2/2 aspiration risk. Feels like she has had some improvement in voice, but it comes and goes. no new lumps or bumps. no otalgia/odynophagia.  ?  12/20/17: Here today for follow up. No complaints, keeping weight ok. Saw XRT recently - doing well from there perspective, next appt in 4 months.  Last visit TSH was 90, synthroid increased to 75 mcg, TSH now 14, FT4 is 0.98. Denies any new lumps or bumps, no pain. Swallowing slightly  improved with ST help at this time.  ?  1/31/18: Doing well. Denies otalgia, dyspnea, voice changes, new masses/lesions. Weight stable/increased (gained 8-lbs). PEG dependent, working with ST?on voice/oral feeding-- passed honey thickened liquids in 9/2017. C/o pain at PEG site-- stabbing, sharp, constant. Seen by [facility] this month-- [facility].  ?  3/14/18: Here for routine surveillance. Feels good. No dysphagia, odynophagia, otalgia. No weight loss. No concerns for new lumps/bumps. Gaining weight. Working with ST. Doing 100% via PEG.  [1]  ?  4/10/18: No new complaints, Still PEG dependent, unable to take PO due to dysphagia.? No SOB.? Weight stable.? No new masses.? No fatigue.? No new pain.  ?  6/14/18: Here for routine surveillance. Feels good. No new dysphagia, odynophagia, otalgia. No weight loss. No concerns for new lumps/bumps. No PO intake, PEG dependant.  ?  7/26/18: Here for routine surveillance. Says she had left sided jaw pain for 2 weeks recently and then it just went away. No lesions or ulcers. Felt deeper and bone pain to her. Has been having issues with her left arm and shoulder since surgery, frustrated she is unable to throw with her great nieces. Has been trying swimming.  ?  9/6/18: Here today for cancer surveillance and to discuss VC injection on L. Patient is open to repeat injection to help voice/breathiness. No new complaints- denies otalgia, weightloss, new nodes. Still using PEG exclusively.  ?  9/21/18: Pt here for repeat VC injection.? No changes to health.  ?  Review of Systems  General: denies fatigue, unintentional weight loss, fever or chills  Eyes: denies vision change  Ears: denies change in hearing, otorrhea, otalgia, tinnitus, or vertigo  Nose: denies rhinorrhea, nasal congestion, sneezing  Mouth: denies sore throat  Neck: denies new neck mass or lymphadenopathy  Respiratory: denies cough, dyspnea  Cardiovascular: denies chest pain  GI: denies dysphagia, or nausea  Skin: denies rash or  changing skin lesion  Endocrine: denies heat or cold intolerance  Neurologic: denies headache, syncope, motor or sensory deficit  Psych: denies anxiety or depression  Physical Exam  ???Vitals & Measurements  ??T:?36.3? ?C (Axillary)? HR:?82(Peripheral)? RR:?16? BP:?123/72?  ??HT:?167?cm? HT:?167?cm? WT:?51.3?kg? WT:?51.3?kg? BMI:?18.39?  General: Awake, alert and oriented. No acute distress  Head: Normocephalic and atraumatic  Eyes: Pupils are equal and reactive to light and accommodation. EOMI  Ears: EACS clear, TMs intact, no effusions  Nose: External examination reveals no abnormalities. Septum midline and no sites of nasal obstruction.  Oral cavity/Oropharynx: No lesions or ulcerations, dentition poor, dentures in maxilla, tongue WNL. No lesions on left mandible mucosa..  Neck: Soft and supple. No palpable adenopathy or other neck mass.  Chest: Symmetric excursion bilaterally. There is no respiratory distress.  Cardiovascular: 2+ radial pulses bilaterally. Regular rate and rhythm.  Integument: No rashes or other skin lesions visualized.  Neurologic: Cranial nerves II - XII are intact.  ?  Flexible Fiberoptic Laryngoscopy/Nasopharyngoscopy (through right nare):??  Operative Findings  NP/OP: no masses/lesions of NC, eustachian tube, fossa of Rosenmuller, no adenoid hypertrophy  BOT/vallecula: no lingual hypertrophy, no masses/lesions, no secretions obscuring visualization  Piriform sinuses/post-cricoid: no masses/lesions, no pooling of secretions  Epiglottis: lingual and laryngeal surfaces within normal limits  Arytenoids/FVFs: no masses/lesions, no edema, bilateral mobility  TVCs:?left TVF paralyzed in paramedian position. right mobile but still with posterior glottic gap  No aspiration, no pooled secretions  No sign of malignancy  Assessment/Plan  ?  62yF with h/o [id number] of the oral tongue s/p CRT 9/2012; recurrent FOM SCC, s/p WLE, B SND, pec flap with sacrifice of carotid artery (reconstruction of the  carotid with carotid to vertebral bypass with Dr. Oh) and CN X-XII on 5/15/2017 at [facility]. Now with L TVC paralysis.  ?  - All R/B/As for DMSL with L TVC injection discussed at length with patient and she will like to proceed with procedure  - To OR on 9/21  - No changes to above H&P.  -RTC after surgery Problem List/Past Medical History  ??Ongoing  ??Anxiety  ??Head and neck cancer  ??Hx-oral/pharynx malg NEC(  Confirmed  )  ??Leg cramps  ??Lymphadenopathy, submandibular  ??Sleep difficulties  ??Tobacco user(  Probable Diagnosis  )  ?Historical  ??Oral cancer  ??Tongue cancer  Procedure/Surgical HistoryIntroduction of Other Therapeutic Substance into Respiratory Tract, Via Natural or Artificial Opening Endoscopic (09/25/2017)  Laryngoscopy (None) (09/25/2017)  Laryngoscopy, direct, with injection into vocal cord(s), therapeutic; with operating microscope or telescope (09/25/2017)  Vocal Cord Injection (Left) (09/25/2017)  Bronchoscopy, Diagnostic (08/24/2015)  Bronchoscopy, rigid or flexible, including fluoroscopic guidance, when performed; diagnostic, with cell washing, when performed (separate procedure) (08/24/2015)  Closed [needle] biopsy of tongue (08/24/2015)  Laryngoscopy (None) (08/24/2015)  Laryngoscopy, direct, operative, with biopsy; (08/24/2015)  Other bronchoscopy (08/24/2015)  Radical neck dissection   ?  Medications  ??aspirin 81 mg oral tablet, 81 mg= 1 tab(s), Oral, Daily  ??gabapentin 300 mg oral capsule, See Instructions,? ?Not taking  ??levothyroxine 100 mcg (0.1 mg) oral tablet, 100 mcg= 1 tab(s), Oral, Daily, 6 refills  ??Levothyroxine 75 Mcg Tablet, 75 mcg= 1 tab(s), Oral, qAM,? ?Not taking  ??Lexapro 5 mg oral tablet, 5 mg= 1 tab(s), Oral, Daily  Allergies  No Known Allergies  Social History  ??Alcohol  ???Past, 03/13/2018  ???Current, 08/13/2015  ???Current, 02/28/2015  ??Employment/School - No Risk, 09/22/2015  ???disability, 02/28/2015  ??Exercise - Regular exercise,  09/22/2015  ???Exercise frequency: Daily., 02/28/2015  ??Home/Environment - No Risk, 09/22/2015  ???Lives with Alone. Living situation: Home/Independent., 02/28/2015  ??Nutrition/Health - Low Risk, 09/22/2015  ???Ensure, 02/28/2015  ??Sexual - No Sexual Activity, 02/28/2015  ??Substance Abuse - No Risk, 09/22/2015  ???Past, 02/28/2015  ??Tobacco  ???Former smoker Use:., 09/06/2018  Family History  ??Alcoholism.: Sister.  Health Maintenance  Health Maintenance  ???Pending?(in the next year)  ??? ??OverDue  ??? ? ? ?Coronary Artery Disease Maintenance-Antiplatelet Agent Prescribed due??and every?  ??? ? ? ?Aspirin Therapy for CVD Prevention due??06/01/18??and every 1??year(s)  ??? ? ? ?Smoking Cessation due??06/15/18??and every 1??year(s)  ??? ??Due?  ??? ? ? ?Alcohol Misuse Screening due??09/06/18??and every 1??year(s)  ??? ? ? ?Breast Cancer Screening due??09/06/18??and every?  ??? ? ? ?Cervical Cancer Screening due??09/06/18??and every?  ??? ? ? ?Colorectal Screening due??09/06/18??and every?  ??? ? ? ?Diabetes Screening due??09/06/18??and every?  ??? ? ? ?Influenza Vaccine due??09/06/18??and every?  ??? ? ? ?Lipid Screening due??09/06/18??and every?  ??? ? ? ?Tetanus Vaccine due??09/06/18??and every 10??year(s)  ??? ? ? ?Zoster Vaccine due??09/06/18??and every 100??year(s)  ???Satisfied?(in the past 1 year)  ??? ??Satisfied?  ??? ? ? ?Blood Pressure Screening on??09/06/18.??Satisfied by Storm Pickett  ??? ? ? ?Body Mass Index Check on??09/06/18.??Satisfied by Storm Pickett  ??? ? ? ?Depression Screening on??09/06/18.??Satisfied by Storm Pickett  ??? ? ? ?Diabetes Screening on??09/18/17.??Satisfied by Geni Colvin  ??? ? ? ?Obesity Screening on??09/06/18.??Satisfied by Storm Pickett  ?  ?   ?  [1]?Office Visit Note; Xiomara Bach MD 07/26/2018 11:50 CDT  Addendum by Erika FRANCISCO, Ivett Brar on September 06, 2018 12:53:08 CDT (Verified)  The patients note was reviewed by me and I agree with the  residents assessment and plan.  Addendum by Ivett James MD on September 06, 2018 12:53:25 CDT (Verified)  I saw and evaluated the patient.? Discussed with resident and agree with residents findings and plan as documented in the residents note.? I was present for the FFL.? Left TVC paralysis, in paramedian position.? + gap with phonation noted.? To the OR for injection  Addendum by Dolores Fischer MD on September 18, 2018 15:50:35 CDT (Verified)  TSH 4.5, improved from previously level. Currently on Synthroid 100mcg, will discuss increasing doseage to 112mcg pre-op.

## 2022-05-04 NOTE — HISTORICAL [facility] CERNER
This is a historical note converted from Lamar. Formatting and pictures may have been removed.  Please reference Lamar for original formatting and attached multimedia. Chief Complaint  Follow up with results of chest x ray  History of Present Illness  5/1/14: 58y with [id number] scca OC s/p XRT (Oct 2012) and was not interested in large resection at that time.  doing ok, she is tolerating PO liquids and drinks 6-8 cans of ensure per day. Her weight has been stable at 113-116lbs since July. She is concerned with the appearance of her teeth and has discussed dental extractions with her radiation oncologist but has had no tooth pain, swelling, or bleeding. She notes no new lumps or bumps, no tongue or oral cavity pain, ulceration, or bleeding.  ????????  July 29, 2014: Still tolerating PO. Gained weight since last visit. Drinking ensure, ice cream, and soup. No dyspnea. No new lumps/bumps  ????????  9/16/14: Still tolerating PO.  ????????  October 28, 2014: Still tolerating liquids PO. No complaints. Weight is stable. Wants residual mandibular dentiition extracted but is going to talk to radiation oncologist first. Discussed the risk of ORN with her and she understands the need for pre and post procedure hyperbaric oxygen. She will contact us if wanting to proceed with extractions.  ?   03/31/2015: Reports?2 weeks of left mandibular pain.? Otherwise, tolerating ensure, weight stable, no new lesions to neck.  ?   4-14-15: Here s/p biopsy in clinic. Feeling less pain than before bx. No bleeding.  Never got CT done bc not called.  ?   4-30-15: Here for CT results-- shows submandibular masses c/w metastatic disease.  Long talk with pt about next steps to take... she is not interested in pursuing anything at this time.  No changes in swallowing, speech. I feel fine.  ?   5/28/15: Returns today in follow up. Has history of [id number] SCCa or the oral cavity and underwent XRT as she refused surgery. Finished in October of 2012.  Had been following up with Dr. Ramirez. Recently noted to have bilateral submadibular nodes. CT concerning for residual disease or mets. She has been refusing any further intervention.  Has been very depressed lately as sister passed away. She admits some denial and is ready to move on with FNA. She otherwise reports no new issues.  ?   6/18/15: Returns today in follow up. Had FNA with Dr. Starr last week and here to discuss results. Reports no new significant issues.  7/2/15: feeling ok. Has a lot of questions about her disease.  7/14/15: s/p CT head,?neck, chest, abdomen. No new issues.  7/28/15: Here today for follow up. She has consultation with Dr. Heard regarding biopsy of lung versus hip lesion on Friday. No other issues.  8/11:15: No new issues, no new lumps/bumps. States she notices her neck lymphadenopathy more now - is a source of discomfort. Otherwise, USOH.  9/3/15: no new issues. floor of mouth still sore after biopsies. States her neck LAD is about the same size as last visit.  ?   04/13/2017: Since last visit in 2015 (at which time +biopsy of FOM,?B LAD), patient has refused any treatment options. Did not pursue treatment with chemo or radiation. Now, with severe left neck pain radiating to face. No dysphagia, odynophagia, voice changes. New larger left neck mass.  ?   6/1/17: Had advanced T4 squamous cell carcinoma in the distant past and refused surgery, instead getting XRT. Presented with persistent disease and most recently underwent excision of floor of mouth, neck dissection, pec flap, with sacrifice of carotid artery and lower cranial nerves, including X, XI, and XII on 5/15/2017. She had reconstruction of the carotid with carotid to vertebral bypass with Dr. Berrios. She is here for her first postoperative visit.  ?  06/08/2017: Complains of PEG ache, worse for 5 days. Using PEG without difficulty. Not performing wound care to neck  ?  6/15/17: Doing well. Performing WtD dressing  changes. Healing well. No drainage. Worried that she irritated her SG donor site. Using PEG and supplementation. Has gained 2-lbs since last visit.  ?  7/6/17: Doing well. Neck wound has almost completely healed. ?Planning for additional XRT with Dr. Ramirez. Tolerating PEG feeds. Pain controlled. No new lumps/bumps.  ?  8/10/17: Doing well. Has 6 XRT treatments left, started 7/10. No issues. Weight stable. Seeing Shonda.  ?  9/12/17: Here for follow up of [id number] of the oral tongue s/p CRT 9/2012. With FOM recurrence s/p excision of floor of mouth, neck dissection, pec flap, with sacrifice of carotid artery and lower cranial nerves, including X, XI, and XII on 5/15/2017. She had reconstruction of the carotid with carotid to vertebral bypass with Dr. Berrios. Finished XRT to neck 8/21/17. No new lesions/masses. Weight is down but seeing nutrition. Taking all intake in PEG. Weight 92 lbs down from 101. Nutrition appt Thursday [1]  ?  10/11/17: s/p L TVC injection laryngoplasty. patient states that she feels like her voice has had some improvment. still having problems with swallowing- states she is having difficulty initiating swallowing. Saw Shonda about 3 weeks ago she thinks and does not have another appointment.  weight stable. no lumps or bumps/changes in dysphagia/otalgia.  c/o central blurry vision in R eye which she noticed several days after surgery [1]  ?  11/8/17: having difficulty gaining weight, but not losing. working with nutritionist. seeing Shonda with ST; did not get esophagram 2/2 aspiration risk. Feels like she has had some improvement in voice, but it comes and goes. no new lumps or bumps. no otalgia/odynophagia.  ?  12/20/17: Here today for follow up. No complaints, keeping weight ok. Saw XRT recently - doing well from there perspective, next appt in 4 months.  Last visit TSH was 90, synthroid increased to 75 mcg, TSH now 14, FT4 is 0.98. Denies any new lumps or bumps, no pain. Swallowing  slightly improved with ST help at this time.  ?  1/31/18: Doing well. Denies otalgia, dyspnea, voice changes, new masses/lesions. Weight stable/increased (gained 8-lbs). PEG dependent, working with ST?on voice/oral feeding-- passed honey thickened liquids in 9/2017. C/o pain at PEG site-- stabbing, sharp, constant. Seen by [facility] this month-- CONSUELO.  ?  3/14/18: Here for routine surveillance. Feels good. No dysphagia, odynophagia, otalgia. No weight loss. No concerns for new lumps/bumps. Gaining weight. Working with ST. Doing 100% via PEG.  [1]  ?  4/10/18: No new complaints, Still PEG dependent, unable to take PO due to dysphagia.? No SOB.? Weight stable.? No new masses.? No fatigue.? No new pain.  ?  6/14/18: Here for routine surveillance. Feels good. No new dysphagia, odynophagia, otalgia. No weight loss. No concerns for new lumps/bumps. No PO intake, PEG dependant.  ?  7/26/18: Here for routine surveillance. Says she had left sided jaw pain for 2 weeks recently and then it just went away. No lesions or ulcers. Felt deeper and bone pain to her. Has been having issues with her left arm and shoulder since surgery, frustrated she is unable to throw with her great nieces. Has been trying swimming.  ?  9/6/18: Here today for cancer surveillance and to discuss VC injection on L. Patient is open to repeat injection to help voice/breathiness. No new complaints- denies otalgia, weight loss, new nodes. Still using PEG exclusively.  ?  9/21/18: Pt here for repeat VC injection.? No changes to health.  ?  10/9/18: Here today post-op from second L TVC injection. States she feels that her voice is better post-op with less breathiness. She is still using PEG tube, NPO. No new issues.  ?  11/20/18: Here for surveillance. Doing very well. No new issues. Feels her voice is stable. Still using PEG primarily. No new lumps/bumps. No oral lesions or bleeding  ?  1/2/19: Here for surveillance check. No ENT issues. Feels voice is stable.  Has had some difficulty with PEG tube (fell out around Thanksgiving and had to be replaced in ED). Has been loose and area around tube has been irritated. [1]  ?  2/14/19: No changes since last visit other than recent fever blister. PEG tube working well. Denies otalgia, dyspnea, voice changes, new masses/lesions. Weight stable. Needs refills on synthroid. [1]  ?  3/28/19: h/o [id number] SCC oral tongue s/p CRT 2012. Recurrent in FOM s/p WLE/ L MRND with sacrifice of carotid CN X, XI, XII, pmmf, vertebral to carotid bypass with Dr. Berrios.  using peg for all nutrition. asking if shell ever swallow again. has not seen dora in 1 year but states she is still doing exercises. no? voice changes. states shes had 2 injections and neither of them helped. denies otalgia/throat pain, new lesions or oral pain.  ?  5/30/19: Here for cancer surveillance. Still with significant dysphagia. PEG dependent. Denies dyspnea. Stable dysphonia.  ?  7/30/2019: Dysphonia stable. Denies any new issues. Dysphagia stable and has seen speech who gave her exercises. remains PEF dependent  ?  9/24/19: Here for surveillance. Has not gotten her yearly labs and chest xray - will order today. No new issues. Still hates her PEG but did not get any benefit with swallow form injections of cord so not interested in further intervention.  Review of Systems  Constitutional - Denies  Eye - Denies  HENT - See HPI  Respiratory - Denies  Cardiovascular - Denies  Gastrointestinal - Denies  Genitourinary - Denies  Heme/Lymph ?- Denies  Endocrine ?- Denies  Immunologic ?- Denies  Musculoskeletal ?- Denies  Integumentary ?- Denies  Neurologic ?- Denies  All Other ROS negative  Physical Exam  Vitals & Measurements  T:?36.1? ?C (Axillary)? HR:?76(Peripheral)? RR:?18? BP:?104/49?  HT:?167.74?cm? WT:?49.8?kg? BMI:?17.7?  General: Alert, oriented, NAD. Voice breathy dysphonia.  Eyes: EOMI, PERRL  Ears: Right EAC WNL, R TM intact without effusion. Left EAC WNL, L TM  intact without effusion. Hears well at normal conversational volume  Nose: Inferior turbinates WNL, septum midline, mucosa moist  Oral cavity: No lesions or ulcerations, edentulous with a few exposed broken teeth anteriorly. vitaly-tongue/FOM WNL  Oropharynx: Tonsils symmetric, no lesions noted  Neck: No lymphadenopathy, no thyromegaly, nontender to palpation. Thin neck, post XRT skin changes.  Respiratory: Normal respiratory effort. No stridor  Integumentary: No rashes or lesions  Neurologic: CN II-XII grossly intact  ?   Flexible Fiberoptic Laryngoscopy:  The patient was anesthetized with topical tetracaine and neosynephrine. The laryngoscope was passed through the right nostril easily.  Nasopharynx: WNL  Oral pharynx/Base of tongue: WNL  Epiglottis: WNL  Piriforms: WNL, no pooling of secretions  Supraglottis/FVFs: WNL  TVFs: left cord immobile and paramedian. Right TVF wnl  Subglottis: wnl  Assessment/Plan  ?? 63yoF with h/o [id number] SCC oral cavity s/p CRT 2012. Recurrent s/p FOM WLE/L MRND- sacrifice of carotid artery(vertebral bypass), CN X, XI, XII/PMMF 5/2017; left TVC in paramedian position with 2 prior injections, not interested in more. PEG dependent 2/2 aspirations.   - Yearly chest xray, TFTs and CMP ordered today for patient to get before next visit.  - CONSUELO on exam today  - RTC 3 months.   Problem List/Past Medical History  Ongoing  Dysphagia  Head and neck cancer  Hx-oral/pharynx malg NEC(  Confirmed  )  Leg cramps  Lymphadenopathy, submandibular  Oral cancer  Sleep difficulties  Tobacco user(  Probable Diagnosis  )  Tongue cancer  Vocal cord paralysis  Historical  Anxiety  Procedure/Surgical History  Introduction of Other Therapeutic Substance into Respiratory Tract, Via Natural or Artificial Opening Endoscopic (09/21/2018)  Laryngoscopy, direct, with injection into vocal cord(s), therapeutic; with operating microscope or telescope (09/21/2018)  Microlaryngoscopy (None) (09/21/2018)  Introduction of  Other Therapeutic Substance into Respiratory Tract, Via Natural or Artificial Opening Endoscopic (09/25/2017)  Laryngoscopy (None) (09/25/2017)  Laryngoscopy, direct, with injection into vocal cord(s), therapeutic; with operating microscope or telescope (09/25/2017)  Vocal Cord Injection (Left) (09/25/2017)  Bronchoscopy, Diagnostic (08/24/2015)  Bronchoscopy, rigid or flexible, including fluoroscopic guidance, when performed; diagnostic, with cell washing, when performed (separate procedure) (08/24/2015)  Closed [needle] biopsy of tongue (08/24/2015)  Laryngoscopy (None) (08/24/2015)  Laryngoscopy, direct, operative, with biopsy; (08/24/2015)  Other bronchoscopy (08/24/2015)  Radical neck dissection   Medications  acyclovir 400 mg oral tablet, 400 mg= 1 tab(s), Oral, TID,? ?Not Taking, Completed Rx  aspirin 81 mg oral tablet, 81 mg= 1 tab(s), Oral, Daily  FLUARIX QUAD 2014-6720 SYRINGE,? ?Not taking  Synthroid 112 mcg (0.112 mg) oral tablet, 112 mcg= 1 tab(s), Oral, Daily, 11 refills  Allergies  No Known Allergies  Social History  Abuse/Neglect  No, No, Yes, 09/24/2019  Alcohol  Past, 03/13/2018  Employment/School - No Risk, 09/22/2015  disability, 02/28/2015  Exercise - Regular exercise, 09/22/2015  Exercise frequency: Daily., 02/28/2015  Home/Environment - No Risk, 09/22/2015  Lives with Alone. Living situation: Home/Independent., 02/28/2015  Nutrition/Health - Low Risk, 09/22/2015  Ensure, 02/28/2015  Sexual - No Sexual Activity, 02/28/2015  Sexually active: No. Sexual orientation: Lesbian, serra or homosexual. Gender Identity Identifies as female., 02/14/2019  Spiritual/Cultural  Pentecostalism, 09/24/2019  Substance Use - No Risk, 09/22/2015  Past, Marijuana, 01/03/2019  Tobacco  Former smoker, quit more than 30 days ago, No, 09/24/2019  Former smoker, quit more than 30 days ago, Cigarettes, N/A, 05/30/2019  Former smoker, quit more than 30 days ago, No, 02/14/2019  Family History  Alcoholism.: Sister.  Health  Maintenance  Health Maintenance  ???Pending?(in the next year)  ??? ??OverDue  ??? ? ? ?Diabetes Screening due??and every?  ??? ? ? ?Aspirin Therapy for CVD Prevention due??06/01/18??and every 1??year(s)  ??? ? ? ?Alcohol Misuse Screening due??01/01/19??and every 1??year(s)  ??? ? ? ?Smoking Cessation due??01/01/19??and every 1??year(s)  ??? ??Due?  ??? ? ? ?ADL Screening due??09/24/19??and every 1??year(s)  ??? ? ? ?Breast Cancer Screening due??09/24/19??and every?  ??? ? ? ?Cervical Cancer Screening due??09/24/19??and every?  ??? ? ? ?Colorectal Screening due??09/24/19??and every?  ??? ? ? ?Influenza Vaccine due??09/24/19??and every?  ??? ? ? ?Lipid Screening due??09/24/19??and every?  ??? ? ? ?Lung Cancer Screening due??09/24/19??and every 1??year(s)  ??? ? ? ?Tetanus Vaccine due??09/24/19??and every 10??year(s)  ??? ? ? ?Zoster Vaccine due??09/24/19??and every 100??year(s)  ??? ??Due In Future?  ??? ? ? ?Obesity Screening not due until??01/01/20??and every 1??year(s)  ??? ? ? ?Blood Pressure Screening not due until??09/23/20??and every 1??year(s)  ??? ? ? ?Body Mass Index Check not due until??09/23/20??and every 1??year(s)  ??? ? ? ?Depression Screening not due until??09/23/20??and every 1??year(s)  ???Satisfied?(in the past 1 year)  ??? ??Satisfied?  ??? ? ? ?Blood Pressure Screening on??09/24/19.??Satisfied by Magalis Keyes  ??? ? ? ?Body Mass Index Check on??09/24/19.??Satisfied by Magalis Keyes  ??? ? ? ?Depression Screening on??09/24/19.??Satisfied by Magalis Keyes  ??? ? ? ?Influenza Vaccine on??11/20/18.  ??? ? ? ?Obesity Screening on??09/24/19.??Satisfied by Magalis Keyes  ?     [1]?Office Visit Note; Aleah Hsu MD 07/30/2019 10:46 CDT   I personally performed/obtained the history and exam on this patient. ?I?also?reviewed the history, exam, assessment, and plan in the resident?s note?and agree. ?I actively participated in this patient?s care and helped to formulate the plan of  care.  ?  I was present for the documented procedure from scope insertion to scope removal, and I agree with the resident?s findings.?After application of topical anesthesia, I used the?flexible laryngoscope?to obtain a full endoscopic view of the base of tongue, larynx, and hypopharynx was obtained with the findings as per the resident note. ?  ?

## 2022-05-04 NOTE — HISTORICAL [facility] CERNER
This is a historical note converted from Lamar. Formatting and pictures may have been removed.  Please reference Lamar for original formatting and attached multimedia. Indication for Surgery  Ms. Owens is a 63yF with h/o [id number] of the oral tongue s/p CRT 9/2012; recurrent FOM SCC, s/p WLE, B SND, pec flap with sacrifice of carotid artery (reconstruction of the carotid with carotid to vertebral bypass with Dr. Oh) and CN X-XII on 5/15/2017 at [facility]. Now with L TVC paralysis.? On FFL has left TVF paralyzed in paramedian position. right mobile but still with posterior glottic gap. Pt received benefit from injection of left TVC previously and desires a re-injection.? All risks/benefits/alternatives were discussed and the patient would like to proceed.  Preoperative Diagnosis  left true vocal cord paralysis  Postoperative Diagnosis  left true vocal cord paralysis  Operation  Direct suspension microlaryngoscopy with injection of left true?vocal cord with?Migelaryn  Surgeon(s)  Magalis Gamboa MD (Resident)  Marin Riddle MD (Resident)  Jun Castellanos MD (Attending)  Assistant  none  Anesthesia  General endotracheal anesthesia  Estimated Blood Loss  2mL  Urine Output  see anesthesia report  Findings  very difficult exposure due to anteriorly located larynx and radiation treatments to the neck, good medialization of VC after injection  Specimen(s)  none  Complications  none  Technique  After proper informed consent was obtained, the patient was brought to the operating room and placed supine on the operating table.? General endotracheal anesthesia was administered with the glide scope and a 5.5 ETT.? The bed was turned 90 degrees and the head wrapped.? The patient was draped and a proper time-out completed confirming the correct patient and procedure.? Wet gauze were placed to protect the ginginva and the Dedo laryngoscope introduced.? The?Dedo laryngoscope was used to examine the upper aerodigestive tract.  Oropharynx appeared within normal limits. The epiglottis and tongue base were examined and within normal limits.? Due to the anterior nature of the glottis and h/o radiation to the neck there was difficulty exposing the larynx even with flexion of the head.? The Ossoff-Pilling laryngoscope was then attempted and the posterior glottis was then visualized.? The laryngoscope was placed into suspension on the Mustard stand. The left true vocal cord was visualized with the assistance of the 0-degree telescope, found to be in a lateralized position.? With the?0-degree scope an injection of Prolaryn gel?(1.0cc gel Part number [id number] lot number [id number]) was attempted but unable to get scope an injection both in laryngoscope.? Next, a different?injector was attempted which when priming lost most of the prolaryn gel in the?wider diameter syringe.? Some was able to salvaged.? The microscope was then brought into place and then ~0.4mL of prolaryn gel were injected?deep to the vocal ligament with care to stay deep to the lamina propria on the posterior 1/3 of the vocal cord. Medialization of the true vocal cord was visualized.? Hemostasis was achieved with pressure and afrin soaked pledgets.?The alveolar gauze was removed and gingiva was inspected and noted to be stable. All instruments were removed from the patient, all counts were correct, and the patient was returned to the care of anesthesia, awakened, and taken to PACU in stable condition.?Dr. Castellanos?was present for the entirety?of the procedure.  ?   Magalis Gamboa MD  Otolaryngology Resident [facility]  ?  ?

## 2022-05-17 ENCOUNTER — OFFICE VISIT (OUTPATIENT)
Dept: OTOLARYNGOLOGY | Facility: CLINIC | Age: 67
End: 2022-05-17
Payer: MEDICAID

## 2022-05-17 ENCOUNTER — HOSPITAL ENCOUNTER (OUTPATIENT)
Dept: RADIOLOGY | Facility: HOSPITAL | Age: 67
Discharge: HOME | End: 2022-05-17
Payer: MEDICAID

## 2022-05-17 VITALS — TEMPERATURE: 98 F | DIASTOLIC BLOOD PRESSURE: 61 MMHG | SYSTOLIC BLOOD PRESSURE: 111 MMHG | HEART RATE: 62 BPM

## 2022-05-17 DIAGNOSIS — J38.00 VOCAL CORD PARALYSIS: ICD-10-CM

## 2022-05-17 DIAGNOSIS — Z92.3 HISTORY OF RADIATION TO HEAD AND NECK REGION: ICD-10-CM

## 2022-05-17 DIAGNOSIS — Z85.89 HISTORY OF SQUAMOUS CELL CARCINOMA: Primary | ICD-10-CM

## 2022-05-17 DIAGNOSIS — R49.0 HOARSENESS: ICD-10-CM

## 2022-05-17 DIAGNOSIS — R13.10 DYSPHAGIA, UNSPECIFIED TYPE: ICD-10-CM

## 2022-05-17 DIAGNOSIS — Z85.89 HISTORY OF HEAD AND NECK CANCER: ICD-10-CM

## 2022-05-17 PROCEDURE — 99212 OFFICE O/P EST SF 10 MIN: CPT | Mod: PBBFAC,25

## 2022-05-17 PROCEDURE — 71045 X-RAY EXAM CHEST 1 VIEW: CPT | Mod: TC

## 2022-05-17 PROCEDURE — 31575 DIAGNOSTIC LARYNGOSCOPY: CPT | Mod: PBBFAC | Performed by: STUDENT IN AN ORGANIZED HEALTH CARE EDUCATION/TRAINING PROGRAM

## 2022-05-17 RX ORDER — LEVOTHYROXINE SODIUM 100 UG/1
100 TABLET ORAL DAILY
COMMUNITY
Start: 2022-05-12 | End: 2022-05-17 | Stop reason: SDUPTHER

## 2022-05-17 RX ORDER — LEVOTHYROXINE SODIUM 100 UG/1
100 TABLET ORAL DAILY
Qty: 90 TABLET | Refills: 4 | Status: SHIPPED | OUTPATIENT
Start: 2022-05-17 | End: 2023-06-23 | Stop reason: SDUPTHER

## 2022-05-17 NOTE — PROGRESS NOTES
"[facility]  Otolaryngology Clinic Note    Luz Elena Owens  Encounter Date: 5/17/2022  YOB: 1955  Physician: Lillie Rios MD    Chief Complaint: Head and neck cancer survelliance    HPI: Luz Elena Owens is a 66 y.o. 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  doing ok, she is tolerating PO liquids and drinks 6-8 cans of ensure per day. Her weight has been stable at 113-116lbs since July. She is concerned with the appearance of her teeth and has discussed dental extractions with her radiation oncologist but has had no tooth pain, swelling, or bleeding. She notes no new lumps or bumps, no tongue or oral cavity pain, ulceration, or bleeding.    July 29, 2014: Still tolerating PO. Gained weight since last visit. Drinking ensure, ice cream, and soup. No dyspnea. No new lumps/bumps    9/16/14: Still tolerating PO.    October 28, 2014: Still tolerating liquids PO. No complaints. Weight is stable. Wants residual mandibular dentiition extracted but is going to talk to radiation oncologist first. Discussed the risk of ORN with her and she understands the need for pre and post procedure hyperbaric oxygen. She will contact us if wanting to proceed with extractions.    03/31/2015: Reports 2 weeks of left mandibular pain. Otherwise, tolerating ensure, weight stable, no new lesions to neck.    4-14-15: Here s/p biopsy in clinic. Feeling less pain than before bx. No bleeding.  Never got CT done bc not called.    4-30-15: Here for CT results-- shows submandibular masses c/w metastatic disease.  Long talk with pt about next steps to take... she is not interested in pursuing anything at this time.  No changes in swallowing, speech. "I feel fine."    5/28/15: Returns today in follow up. Has history of [id number] SCCa or the oral cavity and underwent XRT as she refused surgery. Finished in October of 2012. Had been " following up with Dr. Ramirez. Recently noted to have bilateral submadibular nodes. CT concerning for residual disease or mets. She has been refusing any further intervention.  Has been very depressed lately as sister passed away. She admits some denial and is ready to move on with FNA. She otherwise reports no new issues.    6/18/15: Returns today in follow up. Had FNA with Dr. Starr last week and here to discuss results. Reports no new significant issues.  7/2/15: feeling ok. Has a lot of questions about her disease.  7/14/15: s/p CT head, neck, chest, abdomen. No new issues.  7/28/15: Here today for follow up. She has consultation with Dr. Heard regarding biopsy of lung versus hip lesion on Friday. No other issues.  8/11:15: No new issues, no new lumps/bumps. States she notices her neck lymphadenopathy more now - is a source of discomfort. Otherwise, USOH.  9/3/15: no new issues. floor of mouth still sore after biopsies. States her neck LAD is about the same size as last visit.    04/13/2017: Since last visit in 2015 (at which time +biopsy of FOM, B LAD), patient has refused any treatment options. Did not pursue treatment with chemo or radiation. Now, with severe left neck pain radiating to face. No dysphagia, odynophagia, voice changes. New larger left neck mass.    6/1/17: Had advanced T4 squamous cell carcinoma in the distant past and refused surgery, instead getting XRT. Presented with persistent disease and most recently underwent excision of floor of mouth, neck dissection, pec flap, with sacrifice of carotid artery and lower cranial nerves, including X, XI, and XII on 5/15/2017. She had reconstruction of the carotid with carotid to vertebral bypass with Dr. Berrios. She is here for her first postoperative visit.    06/08/2017: Complains of PEG ache, worse for 5 days. Using PEG without difficulty. Not performing wound care to neck    6/15/17: Doing well. Performing WtD dressing changes. Healing  well. No drainage. Worried that she irritated her SG donor site. Using PEG and supplementation. Has gained 2-lbs since last visit.    7/6/17: Doing well. Neck wound has almost completely healed. Planning for additional XRT with Dr. Ramirez. Tolerating PEG feeds. Pain controlled. No new lumps/bumps.    8/10/17: Doing well. Has 6 XRT treatments left, started 7/10. No issues. Weight stable. Seeing Shonda.    9/12/17: Here for follow up of [id number] of the oral tongue s/p CRT 9/2012. With FOM recurrence s/p excision of floor of mouth, neck dissection, pec flap, with sacrifice of carotid artery and lower cranial nerves, including X, XI, and XII on 5/15/2017. She had reconstruction of the carotid with carotid to vertebral bypass with Dr. Berrios. Finished XRT to neck 8/21/17. No new lesions/masses. Weight is down but seeing nutrition. Taking all intake in PEG. Weight 92 lbs down from 101. Nutrition appt Thursday [1]    10/11/17: s/p L TVC injection laryngoplasty. patient states that she feels like her voice has had some improvment. still having problems with swallowing- states she is having difficulty initiating swallowing. Saw Shonda about 3 weeks ago she thinks and does not have another appointment.  weight stable. no lumps or bumps/changes in dysphagia/otalgia.  c/o central blurry vision in R eye which she noticed several days after surgery [1]    11/8/17: having difficulty gaining weight, but not losing. working with nutritionist. seeing Shonda with ST; did not get esophagram 2/2 aspiration risk. Feels like she has had some improvement in voice, but it comes and goes. no new lumps or bumps. no otalgia/odynophagia.    12/20/17: Here today for follow up. No complaints, keeping weight ok. Saw XRT recently - doing well from there perspective, next appt in 4 months.  Last visit TSH was 90, synthroid increased to 75 mcg, TSH now 14, FT4 is 0.98. Denies any new lumps or bumps, no pain. Swallowing slightly improved with ST  help at this time.    1/31/18: Doing well. Denies otalgia, dyspnea, voice changes, new masses/lesions. Weight stable/increased (gained 8-lbs). PEG dependent, working with ST on voice/oral feeding-- passed honey thickened liquids in 9/2017. C/o pain at PEG site-- stabbing, sharp, constant. Seen by [facility] this month-- CONSUELO.    3/14/18: Here for routine surveillance. Feels good. No dysphagia, odynophagia, otalgia. No weight loss. No concerns for new lumps/bumps. Gaining weight. Working with ST. Doing 100% via PEG.  [1]    4/10/18: No new complaints, Still PEG dependent, unable to take PO due to dysphagia. No SOB. Weight stable. No new masses. No fatigue. No new pain.    6/14/18: Here for routine surveillance. Feels good. No new dysphagia, odynophagia, otalgia. No weight loss. No concerns for new lumps/bumps. No PO intake, PEG dependant.    7/26/18: Here for routine surveillance. Says she had left sided jaw pain for 2 weeks recently and then it just went away. No lesions or ulcers. Felt deeper and bone pain to her. Has been having issues with her left arm and shoulder since surgery, frustrated she is unable to throw with her great nieces. Has been trying swimming.    9/6/18: Here today for cancer surveillance and to discuss VC injection on L. Patient is open to repeat injection to help voice/breathiness. No new complaints- denies otalgia, weight loss, new nodes. Still using PEG exclusively.    9/21/18: Pt here for repeat VC injection. No changes to health.    10/9/18: Here today post-op from second L TVC injection. States she feels that her voice is better post-op with less breathiness. She is still using PEG tube, NPO. No new issues.    11/20/18: Here for surveillance. Doing very well. No new issues. Feels her voice is stable. Still using PEG primarily. No new lumps/bumps. No oral lesions or bleeding    1/2/19: Here for surveillance check. No ENT issues. Feels voice is stable. Has had some difficulty with PEG tube (fell  out around Thanksgiving and had to be replaced in ED). Has been loose and area around tube has been irritated. [1]    2/14/19: No changes since last visit other than recent fever blister. PEG tube working well. Denies otalgia, dyspnea, voice changes, new masses/lesions. Weight stable. Needs refills on synthroid. [1]    3/28/19: h/o [id number] SCC oral tongue s/p CRT 2012. Recurrent in FOM s/p WLE/ L MRND with sacrifice of carotid CN X, XI, XII, pmmf, vertebral to carotid bypass with Dr. Berrios.  using peg for all nutrition. asking if she'll ever swallow again. has not seen dora in 1 year but states she is still doing exercises. no voice changes. states she's had 2 injections and neither of them helped. denies otalgia/throat pain, new lesions or oral pain.    5/30/19: Here for cancer surveillance. Still with significant dysphagia. PEG dependent. Denies dyspnea. Stable dysphonia.    7/30/2019: Dysphonia stable. Denies any new issues. Dysphagia stable and has seen speech who gave her exercises. remains PEG dependent    9/24/19: Here for surveillance. Has not gotten her yearly labs and chest xray - will order today. No new issues. Still hates her PEG but did not get any benefit with swallow form injections of cord so not interested in further intervention.    12/19/19: No c/o today. Needs synthroid refill.    2/27/20: No complaints today. No changes in voice, weight, lumps/bumps. Everything stable. Still hates PEG. Still not wanting any further vocal fold injections.    6/9/20: Here today for follow-up. Voice stable, weight stable, no new otalgia, pain, lumps or bumps in the head or neck. Doing all intake via PEG. Not interested in further vocal fold injections at this time. Doing well overall, no complaints. Needs refill on synthroid. 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  Patient had developed a recurrence and had undergone resection of the floor of mouth with wide local excision and reconstruction with a left pectoralis major myocutaneous flap in conjunction with a left modified radical neck dissection with sacrifice of the left carotid artery (vertebral bypass) and resection of cranial nerves X, XI, and XII in May 2017. 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  Patient also is PEG dependent and takes all her nutrition through her PEG. She also has hypothyroidism. She has no complaints today. She has no sore throat, difficulty breathing, or change in weight. No complaints of any ear pain and no noticeable swelling of the neck.    12/8/20: Doing well. No c/o. Saw Dr. Anne and discussed thyroplasty, but decided it would not offer considerable benefit for phonation. States previous injections did not help for long enough to go through with them again. Forgot to have TFP labs drawn.    4/8/2021: Returns for follow-up. Doing well. No new issues. Needs refill of synthroid. No pneumonias. Had COVID in December 8/5/21: Here for follow-up. Doing well states her weight is stable no recent weight loss fatigue weakness no new lesions in her mouth. Saw Dr. Kimbrough last month and said that her left vertebral anastomosis looked okay and is planning to see him again in year. Has been taking her new Synthroid medication for 4 months now and requesting new prescription.    12/7/21: Here for cancer surveillance. She reports she is well. She denies otalgia, neck pain, changes in voice or swallowing, neck lump/bumps, non healing oral ulcers. Says weight is stable. No changes in breathing    5/17/22: Here today for cancer surveillance. No issues today. Denies any otalgia, neck pain, new lumps/bumps,new voice changes. She remains PEG dependent. Completed CT neck, CXR since last visit.  Had TSH/T4 drawn today    ROS:   General: Negative except per HPI  Skin: Denies rash, ulcer, or lesion.  Eyes: Denies vision changes or diplopia.  Ears: Negative except per HPI  Nose: Negative except per HPI  Throat/mouth: Negative except per HPI  Cardiovascular: Negative except per HPI  Respiratory: Negative except per HPI  Neck: Negative except per HPI  Endocrine: Negative except per HPI  Neurologic: Negative except per HPI    Other 10-point review of systems negative except per HPI      Review of patient's allergies indicates:  No Known Allergies    History reviewed. No pertinent past medical history.    History reviewed. No pertinent surgical history.    Social History     Socioeconomic History    Marital status: Single   Tobacco Use    Smoking status: Former Smoker    Smokeless tobacco: Never Used       History reviewed. No pertinent family history.    Outpatient Encounter Medications as of 5/17/2022   Medication Sig Dispense Refill    levothyroxine (SYNTHROID) 100 MCG tablet Take 100 mcg by mouth once daily.       No facility-administered encounter medications on file as of 5/17/2022.       Physical Exam:  Vitals:    05/17/22 0958   BP: 111/61   Pulse: 62   Temp: 97.5 °F (36.4 °C)       Constitutional  General: AAO, NAD, breathy dysphonia  Neuro: CN II - XII grossly intact except XII, left XI  Head/ Face: NCAT, symmetric, sensations intact bilaterally  Eye: EOMI, PERRLA  Ears: externally normal with grossly normal hearing  AD: normal external ear, EAC patent, TM intact, no middle ear effusion, no retractions  AS: normal external ear, EAC patent, TM intact, no middle ear effusion, no retractions  Nose: bilateral nares patent, left septal deviation, no rhinorrhea, no external deformity, no turbinate hypertrophy  OC/OP: MMM, no intraoral lesions, mouth opening limited to 2 cm, tongue tethered to FOM, essentially edentulous, all mucosa feels soft on palpation, no uvular deviation and symmetric soft palate  rise, small subcentermeter ulceration of R and L hard palate, pictures in the cerner chart, appear smaller/stable today  Indirect laryngoscopy: deferred due to patient intolerance  Neck: woody, consistent with prior radiation, no LAD  Respiratory: nonlabored, no wheezing, bilateral chest rise  Cardiovascular: RR  Psych: Appropriate affect/mood    Procedure: Flexible Laryngoscopy  Procedure in Detail: Informed consent was obtained. The laryngoscope was advanced through the right nare down to the level of the supraglottis with adequate visualization of the nasal cavity, nasopharynx, oropharynx, hypopharynx, supraglottis, and glottis.  Findings:  Nasal cavity/nasopharynx: No lesions throughout nasal cavity, no lesions or masses in nasopharynx including fossa of Rosenmuller  Oropharynx: no lesions or masses along posterior pharyngeal or lateral pharyngeal walls  Vallecula: no lesions or masses in the vallecula, no lingual tonsil hypertrophy  Epiglottis: crisp epiglottis without lesions  Aryepiglottic folds/arytenoids: left arytenoid paretic without movement, right arytenoid full motion  False vocal folds: no lesions, masses, or fullness of the false vocal folds  True vocal cords: paralyzed left cord in paramedian position, right cord fully mobile with patent airway, glottic gap with vocalization  No evidence of malignancy.      Pertinent Data:  ? LABS: TSH, T4 in process    Imaging:   CT neck 4/2021:  IMPRESSION:  1.  Postoperative changes of the neck without definite evidence to  suggest recurrent or metastatic disease.  2.  Patent left-sided carotid to vertebral artery anastomosis.    CT Soft Tissue Neck W Contrast 4/2022  HISTORY: Advanced head and neck cancer, follow-up     COMPARISON: CT neck dated 4/23/2021     TECHNIQUE:   Axial CT images of the neck were obtained after intravenous contrast.  Reformatted images in the sagittal and coronal plane were included.  Automatic exposure control was utilized to limit  radiation dose.  DLP: 403 mGy-cm     FINDINGS:     There are stable postoperative changes of the oral cavity and neck.  There is no new or progressive nodular enhancement to suggest  recurrence. There are secretions layering in the left piriform sinus.  The airways are otherwise patent. There is no cervical  lymphadenopathy.     The parotid glands and thyroid gland are unremarkable. The  submandibular glands have been surgically resected. There is a patent  left carotid to vertebral artery grafting. There is no acute  abnormality of the orbits or visualized brain parenchyma. There is a  small amount of fluid layering in the right maxillary sinus. There is  no destructive bone lesion identified. There is stable scarring in the  right lung apex.     IMPRESSION:  Stable exam without evidence of new or progressive disease.    CXR 5/17/22:   EXAMINATION:  XR CHEST 1 VIEW     CLINICAL HISTORY:  , Paralysis of vocal cords and larynx, unspecified.     FINDINGS:  Examination reveals mediastinal and cardiac silhouettes to be within normal limits. Lung fields are clear and free of gross infiltrates atelectases or effusions     Impression:     No active pulmonary disease      Assessment/Plan:  Luz Elena Owens is a 66 y.o. female h/o [id number] SCC oral cavity s/p CRT 2012. Recurrent s/p FOM WLE/L MRND- sacrifice of carotid artery (vertebral bypass), CN X, XI, XII/PMMF 5/2017; left TVC in paramedian position with 2 prior injections, not interested in more. Not interested in thyroplasty after discussing with Dr. Anne. PEG dependent 2/2 aspirations. CONSUELO today, overall doing well. CXR WNL/stable, CT stable.     TSH elevated in December 2020. Synthroid increased to 100mcg at least visit. TSH/T4 pending from today    - Continue daily synthroid, will send refill after labs result today/confirm correct dosing  - RTC 1 year or sooner if issues, will repeat TSH/T4 at next visit      Lillie Junior MD  [facility] Department of  Otolaryngology  HO-IV

## 2022-05-20 NOTE — PROGRESS NOTES
I have reviewed the notes, assessments, and/or procedures performed this visit, and I concur with the documentation.    Alexandre Rosario M.D.

## 2022-08-18 DIAGNOSIS — R91.8 LUNG MASS: Primary | ICD-10-CM

## 2022-09-28 ENCOUNTER — HOSPITAL ENCOUNTER (OUTPATIENT)
Dept: RADIOLOGY | Facility: HOSPITAL | Age: 67
Discharge: HOME | End: 2022-09-28
Attending: INTERNAL MEDICINE
Payer: MEDICARE

## 2022-09-28 DIAGNOSIS — J85.1 ABSCESS OF UPPER LOBE OF RIGHT LUNG WITH PNEUMONIA: ICD-10-CM

## 2022-09-28 DIAGNOSIS — J85.3 ABSCESS OF MEDIASTINUM: ICD-10-CM

## 2022-09-28 PROCEDURE — 71250 CT THORAX DX C-: CPT | Mod: TC

## 2022-10-03 PROBLEM — J85.2 PULMONARY ABSCESS: Status: ACTIVE | Noted: 2022-10-03

## 2023-01-10 ENCOUNTER — HOSPITAL ENCOUNTER (OUTPATIENT)
Dept: RADIOLOGY | Facility: HOSPITAL | Age: 68
Discharge: HOME | End: 2023-01-10
Attending: INTERNAL MEDICINE
Payer: MEDICARE

## 2023-01-10 DIAGNOSIS — J85.1 ABSCESS OF UPPER LOBE OF RIGHT LUNG WITH PNEUMONIA: ICD-10-CM

## 2023-01-10 DIAGNOSIS — J85.3 ABSCESS OF MEDIASTINUM: ICD-10-CM

## 2023-01-10 PROCEDURE — 71250 CT THORAX DX C-: CPT | Mod: TC

## 2023-04-18 ENCOUNTER — HOSPITAL ENCOUNTER (OUTPATIENT)
Dept: RADIOLOGY | Facility: HOSPITAL | Age: 68
Discharge: HOME | End: 2023-04-18
Attending: INTERNAL MEDICINE
Payer: MEDICARE

## 2023-04-18 DIAGNOSIS — J85.3 ABSCESS OF MEDIASTINUM: ICD-10-CM

## 2023-04-18 DIAGNOSIS — J85.3: ICD-10-CM

## 2023-04-18 DIAGNOSIS — J85.1 ABSCESS OF UPPER LOBE OF RIGHT LUNG WITH PNEUMONIA: ICD-10-CM

## 2023-04-18 DIAGNOSIS — J85.2: ICD-10-CM

## 2023-04-18 PROCEDURE — 71250 CT THORAX DX C-: CPT | Mod: TC

## 2023-06-23 RX ORDER — LEVOTHYROXINE SODIUM 100 UG/1
100 TABLET ORAL DAILY
Qty: 90 TABLET | Refills: 4 | Status: SHIPPED | OUTPATIENT
Start: 2023-06-23

## 2023-07-25 ENCOUNTER — OFFICE VISIT (OUTPATIENT)
Dept: OTOLARYNGOLOGY | Facility: CLINIC | Age: 68
End: 2023-07-25
Payer: MEDICARE

## 2023-07-25 ENCOUNTER — LAB VISIT (OUTPATIENT)
Dept: LAB | Facility: HOSPITAL | Age: 68
End: 2023-07-25
Attending: STUDENT IN AN ORGANIZED HEALTH CARE EDUCATION/TRAINING PROGRAM
Payer: MEDICARE

## 2023-07-25 VITALS
SYSTOLIC BLOOD PRESSURE: 123 MMHG | BODY MASS INDEX: 16.17 KG/M2 | DIASTOLIC BLOOD PRESSURE: 69 MMHG | WEIGHT: 97.19 LBS | HEART RATE: 84 BPM | TEMPERATURE: 98 F

## 2023-07-25 DIAGNOSIS — E03.9 HYPOTHYROIDISM, UNSPECIFIED TYPE: ICD-10-CM

## 2023-07-25 DIAGNOSIS — Z85.89 HISTORY OF HEAD AND NECK CANCER: Primary | ICD-10-CM

## 2023-07-25 DIAGNOSIS — Z85.89 HISTORY OF SQUAMOUS CELL CARCINOMA: ICD-10-CM

## 2023-07-25 DIAGNOSIS — Z85.89 HISTORY OF HEAD AND NECK CANCER: ICD-10-CM

## 2023-07-25 DIAGNOSIS — Z92.3 HISTORY OF RADIATION TO HEAD AND NECK REGION: ICD-10-CM

## 2023-07-25 LAB
T4 FREE SERPL-MCNC: 1.15 NG/DL (ref 0.7–1.48)
TSH SERPL-ACNC: 0.27 UIU/ML (ref 0.35–4.94)

## 2023-07-25 PROCEDURE — 84439 ASSAY OF FREE THYROXINE: CPT

## 2023-07-25 PROCEDURE — 99213 OFFICE O/P EST LOW 20 MIN: CPT | Mod: PBBFAC

## 2023-07-25 PROCEDURE — 84443 ASSAY THYROID STIM HORMONE: CPT

## 2023-07-25 PROCEDURE — 36415 COLL VENOUS BLD VENIPUNCTURE: CPT

## 2023-07-25 NOTE — PROGRESS NOTES
"[facility]  Otolaryngology Clinic Note    Luz Elena Owens  Encounter Date: 7/25/2023  YOB: 1955  Physician: Gabbie Douglas MD    Chief Complaint: Head and neck cancer survelliance    HPI: Luz Elena Owens is a 68 y.o. 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  doing ok, she is tolerating PO liquids and drinks 6-8 cans of ensure per day. Her weight has been stable at 113-116lbs since July. She is concerned with the appearance of her teeth and has discussed dental extractions with her radiation oncologist but has had no tooth pain, swelling, or bleeding. She notes no new lumps or bumps, no tongue or oral cavity pain, ulceration, or bleeding.    July 29, 2014: Still tolerating PO. Gained weight since last visit. Drinking ensure, ice cream, and soup. No dyspnea. No new lumps/bumps    9/16/14: Still tolerating PO.    October 28, 2014: Still tolerating liquids PO. No complaints. Weight is stable. Wants residual mandibular dentiition extracted but is going to talk to radiation oncologist first. Discussed the risk of ORN with her and she understands the need for pre and post procedure hyperbaric oxygen. She will contact us if wanting to proceed with extractions.    03/31/2015: Reports 2 weeks of left mandibular pain. Otherwise, tolerating ensure, weight stable, no new lesions to neck.    4-14-15: Here s/p biopsy in clinic. Feeling less pain than before bx. No bleeding.  Never got CT done bc not called.    4-30-15: Here for CT results-- shows submandibular masses c/w metastatic disease.  Long talk with pt about next steps to take... she is not interested in pursuing anything at this time.  No changes in swallowing, speech. "I feel fine."    5/28/15: Returns today in follow up. Has history of [id number] SCCa or the oral cavity and underwent XRT as she refused surgery. Finished in October of 2012. Had been " following up with Dr. Ramirez. Recently noted to have bilateral submadibular nodes. CT concerning for residual disease or mets. She has been refusing any further intervention.  Has been very depressed lately as sister passed away. She admits some denial and is ready to move on with FNA. She otherwise reports no new issues.    6/18/15: Returns today in follow up. Had FNA with Dr. Starr last week and here to discuss results. Reports no new significant issues.  7/2/15: feeling ok. Has a lot of questions about her disease.  7/14/15: s/p CT head, neck, chest, abdomen. No new issues.  7/28/15: Here today for follow up. She has consultation with Dr. Heard regarding biopsy of lung versus hip lesion on Friday. No other issues.  8/11:15: No new issues, no new lumps/bumps. States she notices her neck lymphadenopathy more now - is a source of discomfort. Otherwise, USOH.  9/3/15: no new issues. floor of mouth still sore after biopsies. States her neck LAD is about the same size as last visit.    04/13/2017: Since last visit in 2015 (at which time +biopsy of FOM, B LAD), patient has refused any treatment options. Did not pursue treatment with chemo or radiation. Now, with severe left neck pain radiating to face. No dysphagia, odynophagia, voice changes. New larger left neck mass.    6/1/17: Had advanced T4 squamous cell carcinoma in the distant past and refused surgery, instead getting XRT. Presented with persistent disease and most recently underwent excision of floor of mouth, neck dissection, pec flap, with sacrifice of carotid artery and lower cranial nerves, including X, XI, and XII on 5/15/2017. She had reconstruction of the carotid with carotid to vertebral bypass with Dr. Berrios. She is here for her first postoperative visit.    06/08/2017: Complains of PEG ache, worse for 5 days. Using PEG without difficulty. Not performing wound care to neck    6/15/17: Doing well. Performing WtD dressing changes. Healing  well. No drainage. Worried that she irritated her SG donor site. Using PEG and supplementation. Has gained 2-lbs since last visit.    7/6/17: Doing well. Neck wound has almost completely healed. Planning for additional XRT with Dr. Ramirez. Tolerating PEG feeds. Pain controlled. No new lumps/bumps.    8/10/17: Doing well. Has 6 XRT treatments left, started 7/10. No issues. Weight stable. Seeing Shonda.    9/12/17: Here for follow up of [id number] of the oral tongue s/p CRT 9/2012. With FOM recurrence s/p excision of floor of mouth, neck dissection, pec flap, with sacrifice of carotid artery and lower cranial nerves, including X, XI, and XII on 5/15/2017. She had reconstruction of the carotid with carotid to vertebral bypass with Dr. Berrios. Finished XRT to neck 8/21/17. No new lesions/masses. Weight is down but seeing nutrition. Taking all intake in PEG. Weight 92 lbs down from 101. Nutrition appt Thursday [1]    10/11/17: s/p L TVC injection laryngoplasty. patient states that she feels like her voice has had some improvment. still having problems with swallowing- states she is having difficulty initiating swallowing. Saw Shonda about 3 weeks ago she thinks and does not have another appointment.  weight stable. no lumps or bumps/changes in dysphagia/otalgia.  c/o central blurry vision in R eye which she noticed several days after surgery [1]    11/8/17: having difficulty gaining weight, but not losing. working with nutritionist. seeing Shonda with ST; did not get esophagram 2/2 aspiration risk. Feels like she has had some improvement in voice, but it comes and goes. no new lumps or bumps. no otalgia/odynophagia.    12/20/17: Here today for follow up. No complaints, keeping weight ok. Saw XRT recently - doing well from there perspective, next appt in 4 months.  Last visit TSH was 90, synthroid increased to 75 mcg, TSH now 14, FT4 is 0.98. Denies any new lumps or bumps, no pain. Swallowing slightly improved with ST  help at this time.    1/31/18: Doing well. Denies otalgia, dyspnea, voice changes, new masses/lesions. Weight stable/increased (gained 8-lbs). PEG dependent, working with ST on voice/oral feeding-- passed honey thickened liquids in 9/2017. C/o pain at PEG site-- stabbing, sharp, constant. Seen by [facility] this month-- CONSUELO.    3/14/18: Here for routine surveillance. Feels good. No dysphagia, odynophagia, otalgia. No weight loss. No concerns for new lumps/bumps. Gaining weight. Working with ST. Doing 100% via PEG.  [1]    4/10/18: No new complaints, Still PEG dependent, unable to take PO due to dysphagia. No SOB. Weight stable. No new masses. No fatigue. No new pain.    6/14/18: Here for routine surveillance. Feels good. No new dysphagia, odynophagia, otalgia. No weight loss. No concerns for new lumps/bumps. No PO intake, PEG dependant.    7/26/18: Here for routine surveillance. Says she had left sided jaw pain for 2 weeks recently and then it just went away. No lesions or ulcers. Felt deeper and bone pain to her. Has been having issues with her left arm and shoulder since surgery, frustrated she is unable to throw with her great nieces. Has been trying swimming.    9/6/18: Here today for cancer surveillance and to discuss VC injection on L. Patient is open to repeat injection to help voice/breathiness. No new complaints- denies otalgia, weight loss, new nodes. Still using PEG exclusively.    9/21/18: Pt here for repeat VC injection. No changes to health.    10/9/18: Here today post-op from second L TVC injection. States she feels that her voice is better post-op with less breathiness. She is still using PEG tube, NPO. No new issues.    11/20/18: Here for surveillance. Doing very well. No new issues. Feels her voice is stable. Still using PEG primarily. No new lumps/bumps. No oral lesions or bleeding    1/2/19: Here for surveillance check. No ENT issues. Feels voice is stable. Has had some difficulty with PEG tube (fell  out around Thanksgiving and had to be replaced in ED). Has been loose and area around tube has been irritated. [1]    2/14/19: No changes since last visit other than recent fever blister. PEG tube working well. Denies otalgia, dyspnea, voice changes, new masses/lesions. Weight stable. Needs refills on synthroid. [1]    3/28/19: h/o [id number] SCC oral tongue s/p CRT 2012. Recurrent in FOM s/p WLE/ L MRND with sacrifice of carotid CN X, XI, XII, pmmf, vertebral to carotid bypass with Dr. Berrios.  using peg for all nutrition. asking if she'll ever swallow again. has not seen dora in 1 year but states she is still doing exercises. no voice changes. states she's had 2 injections and neither of them helped. denies otalgia/throat pain, new lesions or oral pain.    5/30/19: Here for cancer surveillance. Still with significant dysphagia. PEG dependent. Denies dyspnea. Stable dysphonia.    7/30/2019: Dysphonia stable. Denies any new issues. Dysphagia stable and has seen speech who gave her exercises. remains PEG dependent    9/24/19: Here for surveillance. Has not gotten her yearly labs and chest xray - will order today. No new issues. Still hates her PEG but did not get any benefit with swallow form injections of cord so not interested in further intervention.    12/19/19: No c/o today. Needs synthroid refill.    2/27/20: No complaints today. No changes in voice, weight, lumps/bumps. Everything stable. Still hates PEG. Still not wanting any further vocal fold injections.    6/9/20: Here today for follow-up. Voice stable, weight stable, no new otalgia, pain, lumps or bumps in the head or neck. Doing all intake via PEG. Not interested in further vocal fold injections at this time. Doing well overall, no complaints. Needs refill on synthroid. 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  Patient had developed a recurrence and had undergone resection of the floor of mouth with wide local excision and reconstruction with a left pectoralis major myocutaneous flap in conjunction with a left modified radical neck dissection with sacrifice of the left carotid artery (vertebral bypass) and resection of cranial nerves X, XI, and XII in May 2017. 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  Patient also is PEG dependent and takes all her nutrition through her PEG. She also has hypothyroidism. She has no complaints today. She has no sore throat, difficulty breathing, or change in weight. No complaints of any ear pain and no noticeable swelling of the neck.    12/8/20: Doing well. No c/o. Saw Dr. Anne and discussed thyroplasty, but decided it would not offer considerable benefit for phonation. States previous injections did not help for long enough to go through with them again. Forgot to have TFP labs drawn.    4/8/2021: Returns for follow-up. Doing well. No new issues. Needs refill of synthroid. No pneumonias. Had COVID in December 8/5/21: Here for follow-up. Doing well states her weight is stable no recent weight loss fatigue weakness no new lesions in her mouth. Saw Dr. Kimbrough last month and said that her left vertebral anastomosis looked okay and is planning to see him again in year. Has been taking her new Synthroid medication for 4 months now and requesting new prescription.    12/7/21: Here for cancer surveillance. She reports she is well. She denies otalgia, neck pain, changes in voice or swallowing, neck lump/bumps, non healing oral ulcers. Says weight is stable. No changes in breathing    5/17/22: Here today for cancer surveillance. No issues today. Denies any otalgia, neck pain, new lumps/bumps,new voice changes. She remains PEG dependent. Completed CT neck, CXR since last visit.  "Had TSH/T4 drawn today.    23: Here for cancer surveillance. No weight loss though struggling to gain weight. No lesions in mouth or non-healing ulcers. No voice changes, no swallowing changes, no neck lumps or bumps, no breathing changes. Did have PNA last Sept or so but no issues. Had macular surgery and said it was "the worst ever, worse than having neck cut ear to ear."     ROS:   General: Negative except per HPI  Skin: Denies rash, ulcer, or lesion.  Eyes: Denies vision changes or diplopia.  Ears: Negative except per HPI  Nose: Negative except per HPI  Throat/mouth: Negative except per HPI  Cardiovascular: Negative except per HPI  Respiratory: Negative except per HPI  Neck: Negative except per HPI  Endocrine: Negative except per HPI  Neurologic: Negative except per HPI    Other 10-point review of systems negative except per HPI      Review of patient's allergies indicates:  No Known Allergies    Past Medical History:   Diagnosis Date    Oral cancer        Past Surgical History:   Procedure Laterality Date    RADICAL NECK DISSECTION N/A        Social History     Socioeconomic History    Marital status: Single   Tobacco Use    Smoking status: Former     Packs/day: 0.50     Years: 8.00     Pack years: 4.00     Types: Cigarettes     Quit date:      Years since quittin.5    Smokeless tobacco: Never   Social History Narrative    ** Merged History Encounter **            History reviewed. No pertinent family history.    Outpatient Encounter Medications as of 2023   Medication Sig Dispense Refill    amoxicillin-clavulanate (AUGMENTIN) 400-57 mg/5 mL SusR Take 10mL by mouth twice daily. (Patient not taking: Reported on 2023) 1000 mL 1    amoxicillin-clavulanate (AUGMENTIN) 400-57 mg/5 mL SusR Take 10 mLs by mouth every 12 (twelve) hours. (Patient not taking: Reported on 2023) 200 mL 0    levothyroxine (SYNTHROID) 100 MCG tablet Take 1 tablet (100 mcg total) by mouth once daily. 90 tablet 4 "     No facility-administered encounter medications on file as of 7/25/2023.       Physical Exam:  Vitals:    07/25/23 1501   BP: 123/69   BP Location: Left arm   Patient Position: Sitting   BP Method: Medium (Automatic)   Pulse: 84   Temp: 97.6 °F (36.4 °C)   Weight: 44.1 kg (97 lb 3.2 oz)       Constitutional  General: AAO, NAD, breathy dysphonia  Neuro: CN II - XII grossly intact except XII, left XI  Head/ Face: NCAT, symmetric, sensations intact bilaterally  Eye: EOMI, PERRLA  Ears: externally normal with grossly normal hearing  AD: normal external ear, EAC patent, TM intact, no middle ear effusion, no retractions  AS: normal external ear, EAC patent, TM intact, no middle ear effusion, no retractions- small 4mm scab   Nose: bilateral nares patent, left septal deviation, no rhinorrhea, no external deformity, no turbinate hypertrophy  OC/OP: MMM, no intraoral lesions, mouth opening limited to 2 cm, tongue tethered to FOM, essentially edentulous, all mucosa feels soft on palpation, no uvular deviation and symmetric soft palate rise  Indirect laryngoscopy: deferred due to patient intolerance  Neck: woody, consistent with prior radiation, no LAD  Respiratory: nonlabored, no wheezing, bilateral chest rise  Cardiovascular: RR  Psych: Appropriate affect/mood    Procedure: Flexible Laryngoscopy  Procedure in Detail: Informed consent was obtained. The laryngoscope was advanced through the right nare down to the level of the supraglottis with adequate visualization of the nasal cavity, nasopharynx, oropharynx, hypopharynx, supraglottis, and glottis.  Findings:  Nasal cavity/nasopharynx: No lesions throughout nasal cavity, no lesions or masses in nasopharynx including fossa of Rosenmuller  Oropharynx: no lesions or masses along posterior pharyngeal or lateral pharyngeal walls  Vallecula: no lesions or masses in the vallecula, no lingual tonsil hypertrophy  Epiglottis: crisp epiglottis without lesions  Aryepiglottic  folds/arytenoids: left arytenoid paretic without movement, right arytenoid full motion  False vocal folds: no lesions, masses, or fullness of the false vocal folds  True vocal cords: paralyzed left cord in paramedian position, right cord fully mobile with patent airway, glottic gap with vocalization  No evidence of malignancy.      Pertinent Data:  ? LABS: TSH, T4 in process    Imaging:   CT neck 4/2021:  IMPRESSION:  1.  Postoperative changes of the neck without definite evidence to  suggest recurrent or metastatic disease.  2.  Patent left-sided carotid to vertebral artery anastomosis.    CT Soft Tissue Neck W Contrast 4/2022  HISTORY: Advanced head and neck cancer, follow-up     COMPARISON: CT neck dated 4/23/2021     TECHNIQUE:   Axial CT images of the neck were obtained after intravenous contrast.  Reformatted images in the sagittal and coronal plane were included.  Automatic exposure control was utilized to limit radiation dose.  DLP: 403 mGy-cm     FINDINGS:     There are stable postoperative changes of the oral cavity and neck.  There is no new or progressive nodular enhancement to suggest  recurrence. There are secretions layering in the left piriform sinus.  The airways are otherwise patent. There is no cervical  lymphadenopathy.     The parotid glands and thyroid gland are unremarkable. The  submandibular glands have been surgically resected. There is a patent  left carotid to vertebral artery grafting. There is no acute  abnormality of the orbits or visualized brain parenchyma. There is a  small amount of fluid layering in the right maxillary sinus. There is  no destructive bone lesion identified. There is stable scarring in the  right lung apex.     IMPRESSION:  Stable exam without evidence of new or progressive disease.    CXR 5/17/22:   EXAMINATION:  XR CHEST 1 VIEW     CLINICAL HISTORY:  , Paralysis of vocal cords and larynx, unspecified.     FINDINGS:  Examination reveals mediastinal and cardiac  silhouettes to be within normal limits. Lung fields are clear and free of gross infiltrates atelectases or effusions     Impression:     No active pulmonary disease      Assessment/Plan:  Luz Elena Owens is a 68 y.o. female h/o [id number] SCC oral cavity s/p CRT 2012. Recurrent s/p FOM WLE/L MRND- sacrifice of carotid artery (vertebral bypass), CN X, XI, XII/PMMF 5/2017; left TVC in paramedian position with 2 prior injections, not interested in more. Not interested in thyroplasty after discussing with Dr. Anne. PEG dependent 2/2 aspirations. CONSUELO today, overall doing well. CXR WNL/stable, CT stable.     TSH elevated in December 2020. Synthroid increased to 100mcg at least visit. TSH/T4 ordered for today    - Continue daily synthroid, will f/u labs today  - Instructed to make sure left ear scab to ensure it heals, she reports it was from a sunburn and hasn't healed bc she sleeps on that side and irritates it  - RTC 1 year or sooner if issues, will repeat TSH/T4 at next visit    Gabbie Douglas MD  [facility]

## 2023-08-22 NOTE — PROGRESS NOTES
I reviewed the history and physical exam with the resident.   I agree with findings and plan.    Alexandre Rosario M.D.

## 2023-10-09 ENCOUNTER — HOSPITAL ENCOUNTER (OUTPATIENT)
Dept: RADIOLOGY | Facility: HOSPITAL | Age: 68
Discharge: HOME | End: 2023-10-09
Attending: INTERNAL MEDICINE
Payer: MEDICARE

## 2023-10-09 DIAGNOSIS — J85.1 ABSCESS OF LUNG WITH PNEUMONIA, UNSPECIFIED LATERALITY: ICD-10-CM

## 2023-10-09 PROCEDURE — 71250 CT THORAX DX C-: CPT | Mod: TC

## 2024-01-18 ENCOUNTER — HOSPITAL ENCOUNTER (OUTPATIENT)
Dept: RADIOLOGY | Facility: HOSPITAL | Age: 69
Discharge: HOME | End: 2024-01-18
Attending: INTERNAL MEDICINE
Payer: MEDICARE

## 2024-01-18 DIAGNOSIS — R91.1 LUNG NODULE: ICD-10-CM

## 2024-01-18 PROCEDURE — 71250 CT THORAX DX C-: CPT | Mod: TC

## 2024-07-09 RX ORDER — LEVOTHYROXINE SODIUM 100 UG/1
100 TABLET ORAL DAILY
Qty: 90 TABLET | Refills: 4 | Status: SHIPPED | OUTPATIENT
Start: 2024-07-09

## 2024-07-25 ENCOUNTER — OFFICE VISIT (OUTPATIENT)
Dept: OTOLARYNGOLOGY | Facility: CLINIC | Age: 69
End: 2024-07-25
Payer: MEDICARE

## 2024-07-25 VITALS
OXYGEN SATURATION: 98 % | BODY MASS INDEX: 17.69 KG/M2 | TEMPERATURE: 98 F | HEIGHT: 65 IN | HEART RATE: 87 BPM | WEIGHT: 106.19 LBS | SYSTOLIC BLOOD PRESSURE: 121 MMHG | RESPIRATION RATE: 20 BRPM | DIASTOLIC BLOOD PRESSURE: 83 MMHG

## 2024-07-25 DIAGNOSIS — Z85.89 HISTORY OF SQUAMOUS CELL CARCINOMA: ICD-10-CM

## 2024-07-25 DIAGNOSIS — Z85.89 HISTORY OF HEAD AND NECK CANCER: Primary | ICD-10-CM

## 2024-07-25 DIAGNOSIS — H93.90 EAR LESION: ICD-10-CM

## 2024-07-25 PROCEDURE — 99213 OFFICE O/P EST LOW 20 MIN: CPT | Mod: PBBFAC | Performed by: STUDENT IN AN ORGANIZED HEALTH CARE EDUCATION/TRAINING PROGRAM

## 2024-07-25 RX ORDER — LIDOCAINE HYDROCHLORIDE 40 MG/ML
SOLUTION TOPICAL
Status: DISCONTINUED
Start: 2024-07-25 | End: 2024-07-25 | Stop reason: HOSPADM

## 2024-07-25 RX ORDER — LIDOCAINE HYDROCHLORIDE AND EPINEPHRINE BITARTRATE 20; .01 MG/ML; MG/ML
INJECTION, SOLUTION SUBCUTANEOUS
Status: DISCONTINUED
Start: 2024-07-25 | End: 2024-07-25 | Stop reason: HOSPADM

## 2024-07-25 NOTE — PROGRESS NOTES
The scope used for the exam was:  Flexible scope ENF-P4  Serial Number:  1)    [id number]    []   2)    [id number]    [x]   3)    [id number]    []   4)    [id number]    []   5)    [id number]    []   6)    [id number]    []       The scope used for the exam was:  Rigid scope   Serial Number:  1)   [id number]    []   2)   [id number]    []   3)   [id number]    []   4)    3384   []   5)    0824   []   6)    5554   []     7)   7425    []   8)   2240    []   9)   1109    []

## 2024-07-25 NOTE — PROGRESS NOTES
"[facility]  Otolaryngology Clinic Note    Luz Elena Owens  Encounter Date: 7/25/2024  YOB: 1955  Physician: Caro Garcia MD    Chief Complaint: Head and neck cancer survelliance    HPI: Luz Elena Owens is a 69 y.o. 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  doing ok, she is tolerating PO liquids and drinks 6-8 cans of ensure per day. Her weight has been stable at 113-116lbs since July. She is concerned with the appearance of her teeth and has discussed dental extractions with her radiation oncologist but has had no tooth pain, swelling, or bleeding. She notes no new lumps or bumps, no tongue or oral cavity pain, ulceration, or bleeding.    July 29, 2014: Still tolerating PO. Gained weight since last visit. Drinking ensure, ice cream, and soup. No dyspnea. No new lumps/bumps    9/16/14: Still tolerating PO.    October 28, 2014: Still tolerating liquids PO. No complaints. Weight is stable. Wants residual mandibular dentiition extracted but is going to talk to radiation oncologist first. Discussed the risk of ORN with her and she understands the need for pre and post procedure hyperbaric oxygen. She will contact us if wanting to proceed with extractions.    03/31/2015: Reports 2 weeks of left mandibular pain. Otherwise, tolerating ensure, weight stable, no new lesions to neck.    4-14-15: Here s/p biopsy in clinic. Feeling less pain than before bx. No bleeding.  Never got CT done bc not called.    4-30-15: Here for CT results-- shows submandibular masses c/w metastatic disease.  Long talk with pt about next steps to take... she is not interested in pursuing anything at this time.  No changes in swallowing, speech. "I feel fine."    5/28/15: Returns today in follow up. Has history of [id number] SCCa or the oral cavity and underwent XRT as she refused surgery. Finished in October of 2012. Had been " following up with Dr. Ramirez. Recently noted to have bilateral submadibular nodes. CT concerning for residual disease or mets. She has been refusing any further intervention.  Has been very depressed lately as sister passed away. She admits some denial and is ready to move on with FNA. She otherwise reports no new issues.    6/18/15: Returns today in follow up. Had FNA with Dr. Starr last week and here to discuss results. Reports no new significant issues.  7/2/15: feeling ok. Has a lot of questions about her disease.  7/14/15: s/p CT head, neck, chest, abdomen. No new issues.  7/28/15: Here today for follow up. She has consultation with Dr. Heard regarding biopsy of lung versus hip lesion on Friday. No other issues.  8/11:15: No new issues, no new lumps/bumps. States she notices her neck lymphadenopathy more now - is a source of discomfort. Otherwise, USOH.  9/3/15: no new issues. floor of mouth still sore after biopsies. States her neck LAD is about the same size as last visit.    04/13/2017: Since last visit in 2015 (at which time +biopsy of FOM, B LAD), patient has refused any treatment options. Did not pursue treatment with chemo or radiation. Now, with severe left neck pain radiating to face. No dysphagia, odynophagia, voice changes. New larger left neck mass.    6/1/17: Had advanced T4 squamous cell carcinoma in the distant past and refused surgery, instead getting XRT. Presented with persistent disease and most recently underwent excision of floor of mouth, neck dissection, pec flap, with sacrifice of carotid artery and lower cranial nerves, including X, XI, and XII on 5/15/2017. She had reconstruction of the carotid with carotid to vertebral bypass with Dr. Berrios. She is here for her first postoperative visit.    06/08/2017: Complains of PEG ache, worse for 5 days. Using PEG without difficulty. Not performing wound care to neck    6/15/17: Doing well. Performing WtD dressing changes. Healing  well. No drainage. Worried that she irritated her SG donor site. Using PEG and supplementation. Has gained 2-lbs since last visit.    7/6/17: Doing well. Neck wound has almost completely healed. Planning for additional XRT with Dr. Ramirez. Tolerating PEG feeds. Pain controlled. No new lumps/bumps.    8/10/17: Doing well. Has 6 XRT treatments left, started 7/10. No issues. Weight stable. Seeing Shonda.    9/12/17: Here for follow up of [id number] of the oral tongue s/p CRT 9/2012. With FOM recurrence s/p excision of floor of mouth, neck dissection, pec flap, with sacrifice of carotid artery and lower cranial nerves, including X, XI, and XII on 5/15/2017. She had reconstruction of the carotid with carotid to vertebral bypass with Dr. Berrios. Finished XRT to neck 8/21/17. No new lesions/masses. Weight is down but seeing nutrition. Taking all intake in PEG. Weight 92 lbs down from 101. Nutrition appt Thursday [1]    10/11/17: s/p L TVC injection laryngoplasty. patient states that she feels like her voice has had some improvment. still having problems with swallowing- states she is having difficulty initiating swallowing. Saw Shonda about 3 weeks ago she thinks and does not have another appointment.  weight stable. no lumps or bumps/changes in dysphagia/otalgia.  c/o central blurry vision in R eye which she noticed several days after surgery [1]    11/8/17: having difficulty gaining weight, but not losing. working with nutritionist. seeing Shonda with ST; did not get esophagram 2/2 aspiration risk. Feels like she has had some improvement in voice, but it comes and goes. no new lumps or bumps. no otalgia/odynophagia.    12/20/17: Here today for follow up. No complaints, keeping weight ok. Saw XRT recently - doing well from there perspective, next appt in 4 months.  Last visit TSH was 90, synthroid increased to 75 mcg, TSH now 14, FT4 is 0.98. Denies any new lumps or bumps, no pain. Swallowing slightly improved with ST  help at this time.    1/31/18: Doing well. Denies otalgia, dyspnea, voice changes, new masses/lesions. Weight stable/increased (gained 8-lbs). PEG dependent, working with ST on voice/oral feeding-- passed honey thickened liquids in 9/2017. C/o pain at PEG site-- stabbing, sharp, constant. Seen by [facility] this month-- CONSUELO.    3/14/18: Here for routine surveillance. Feels good. No dysphagia, odynophagia, otalgia. No weight loss. No concerns for new lumps/bumps. Gaining weight. Working with ST. Doing 100% via PEG.  [1]    4/10/18: No new complaints, Still PEG dependent, unable to take PO due to dysphagia. No SOB. Weight stable. No new masses. No fatigue. No new pain.    6/14/18: Here for routine surveillance. Feels good. No new dysphagia, odynophagia, otalgia. No weight loss. No concerns for new lumps/bumps. No PO intake, PEG dependant.    7/26/18: Here for routine surveillance. Says she had left sided jaw pain for 2 weeks recently and then it just went away. No lesions or ulcers. Felt deeper and bone pain to her. Has been having issues with her left arm and shoulder since surgery, frustrated she is unable to throw with her great nieces. Has been trying swimming.    9/6/18: Here today for cancer surveillance and to discuss VC injection on L. Patient is open to repeat injection to help voice/breathiness. No new complaints- denies otalgia, weight loss, new nodes. Still using PEG exclusively.    9/21/18: Pt here for repeat VC injection. No changes to health.    10/9/18: Here today post-op from second L TVC injection. States she feels that her voice is better post-op with less breathiness. She is still using PEG tube, NPO. No new issues.    11/20/18: Here for surveillance. Doing very well. No new issues. Feels her voice is stable. Still using PEG primarily. No new lumps/bumps. No oral lesions or bleeding    1/2/19: Here for surveillance check. No ENT issues. Feels voice is stable. Has had some difficulty with PEG tube (fell  out around Thanksgiving and had to be replaced in ED). Has been loose and area around tube has been irritated. [1]    2/14/19: No changes since last visit other than recent fever blister. PEG tube working well. Denies otalgia, dyspnea, voice changes, new masses/lesions. Weight stable. Needs refills on synthroid. [1]    3/28/19: h/o [id number] SCC oral tongue s/p CRT 2012. Recurrent in FOM s/p WLE/ L MRND with sacrifice of carotid CN X, XI, XII, pmmf, vertebral to carotid bypass with Dr. Berrios.  using peg for all nutrition. asking if she'll ever swallow again. has not seen dora in 1 year but states she is still doing exercises. no voice changes. states she's had 2 injections and neither of them helped. denies otalgia/throat pain, new lesions or oral pain.    5/30/19: Here for cancer surveillance. Still with significant dysphagia. PEG dependent. Denies dyspnea. Stable dysphonia.    7/30/2019: Dysphonia stable. Denies any new issues. Dysphagia stable and has seen speech who gave her exercises. remains PEG dependent    9/24/19: Here for surveillance. Has not gotten her yearly labs and chest xray - will order today. No new issues. Still hates her PEG but did not get any benefit with swallow form injections of cord so not interested in further intervention.    12/19/19: No c/o today. Needs synthroid refill.    2/27/20: No complaints today. No changes in voice, weight, lumps/bumps. Everything stable. Still hates PEG. Still not wanting any further vocal fold injections.    6/9/20: Here today for follow-up. Voice stable, weight stable, no new otalgia, pain, lumps or bumps in the head or neck. Doing all intake via PEG. Not interested in further vocal fold injections at this time. Doing well overall, no complaints. Needs refill on synthroid. 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  Patient had developed a recurrence and had undergone resection of the floor of mouth with wide local excision and reconstruction with a left pectoralis major myocutaneous flap in conjunction with a left modified radical neck dissection with sacrifice of the left carotid artery (vertebral bypass) and resection of cranial nerves X, XI, and XII in May 2017. 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  Patient also is PEG dependent and takes all her nutrition through her PEG. She also has hypothyroidism. She has no complaints today. She has no sore throat, difficulty breathing, or change in weight. No complaints of any ear pain and no noticeable swelling of the neck.    12/8/20: Doing well. No c/o. Saw Dr. Anne and discussed thyroplasty, but decided it would not offer considerable benefit for phonation. States previous injections did not help for long enough to go through with them again. Forgot to have TFP labs drawn.    4/8/2021: Returns for follow-up. Doing well. No new issues. Needs refill of synthroid. No pneumonias. Had COVID in December 8/5/21: Here for follow-up. Doing well states her weight is stable no recent weight loss fatigue weakness no new lesions in her mouth. Saw Dr. Kimbrough last month and said that her left vertebral anastomosis looked okay and is planning to see him again in year. Has been taking her new Synthroid medication for 4 months now and requesting new prescription.    12/7/21: Here for cancer surveillance. She reports she is well. She denies otalgia, neck pain, changes in voice or swallowing, neck lump/bumps, non healing oral ulcers. Says weight is stable. No changes in breathing    5/17/22: Here today for cancer surveillance. No issues today. Denies any otalgia, neck pain, new lumps/bumps,new voice changes. She remains PEG dependent. Completed CT neck, CXR since last visit.  "Had TSH/T4 drawn today.    23: Here for cancer surveillance. No weight loss though struggling to gain weight. No lesions in mouth or non-healing ulcers. No voice changes, no swallowing changes, no neck lumps or bumps, no breathing changes. Did have PNA last Sept or so but no issues. Had macular surgery and said it was "the worst ever, worse than having neck cut ear to ear."     24:  Here for cancer surveillance.  Continues to be mostly PEG dependent.  Patient had a nonhealing ear lesion on the left ear. It has been there for a  year. It scabs occasionally.  Otherwise denies otalgia, changes in voice, issues breathing.     ROS:   General: Negative except per HPI  Skin: Denies rash, ulcer, or lesion.  Eyes: Denies vision changes or diplopia.  Ears: Negative except per HPI  Nose: Negative except per HPI  Throat/mouth: Negative except per HPI  Cardiovascular: Negative except per HPI  Respiratory: Negative except per HPI  Neck: Negative except per HPI  Endocrine: Negative except per HPI  Neurologic: Negative except per HPI    Other 10-point review of systems negative except per HPI      Review of patient's allergies indicates:  No Known Allergies    Past Medical History:   Diagnosis Date    Oral cancer        Past Surgical History:   Procedure Laterality Date    RADICAL NECK DISSECTION N/A        Social History     Socioeconomic History    Marital status: Single   Tobacco Use    Smoking status: Former     Current packs/day: 0.00     Average packs/day: 0.5 packs/day for 8.0 years (4.0 ttl pk-yrs)     Types: Cigarettes     Start date:      Quit date: 2016     Years since quittin.5    Smokeless tobacco: Never   Social History Narrative    ** Merged History Encounter **            No family history on file.    Outpatient Encounter Medications as of 2024   Medication Sig Dispense Refill    levothyroxine (SYNTHROID) 100 MCG tablet Take 1 tablet (100 mcg total) by mouth once daily. 90 tablet 4    " "amoxicillin-clavulanate (AUGMENTIN) 400-57 mg/5 mL SusR Take 10mL by mouth twice daily. (Patient not taking: Reported on 1/24/2023) 1000 mL 1    amoxicillin-clavulanate (AUGMENTIN) 400-57 mg/5 mL SusR Take 10 mLs by mouth every 12 (twelve) hours. (Patient not taking: Reported on 1/24/2023) 200 mL 0     No facility-administered encounter medications on file as of 7/25/2024.       Physical Exam:  Vitals:    07/25/24 1232 07/25/24 1235   BP: (!) 89/69 121/83   BP Location: Left arm Right arm   Patient Position: Sitting Sitting   BP Method: Medium (Automatic) Medium (Automatic)   Pulse: 72 87   Resp: 20    Temp: 97.6 °F (36.4 °C)    TempSrc: Axillary    SpO2: 98%    Weight: 48.2 kg (106 lb 3.2 oz)    Height: 5' 5" (1.651 m)        Constitutional  General: AAO, NAD, breathy dysphonia  Neuro: CN II - XII grossly intact except XII, left XI  Head/ Face: NCAT, symmetric, sensations intact bilaterally  Eye: EOMI, PERRLA  Ears: externally normal with grossly normal hearing  AD: normal external ear, EAC patent, TM intact, no middle ear effusion, no retractions  AS: normal external ear, EAC patent, TM intact, no middle ear effusion, no retractions- small 4mm scab along helix  Nose: bilateral nares patent, left septal deviation, no rhinorrhea, no external deformity, no turbinate hypertrophy  OC/OP: MMM, no intraoral lesions, mouth opening limited to 2 cm, tongue tethered to FOM, essentially edentulous, all mucosa feels soft on palpation, no uvular deviation and symmetric soft palate rise  Indirect laryngoscopy: deferred due to patient intolerance  Neck: woody, consistent with prior radiation, no LAD  Respiratory: nonlabored, no wheezing, bilateral chest rise  Cardiovascular: RR  Psych: Appropriate affect/mood    Procedure: Flexible Laryngoscopy  Procedure in Detail: Informed consent was obtained. The laryngoscope was advanced through the right nare down to the level of the supraglottis with adequate visualization of the nasal " cavity, nasopharynx, oropharynx, hypopharynx, supraglottis, and glottis.  Findings:  Nasal cavity/nasopharynx: No lesions throughout nasal cavity, no lesions or masses in nasopharynx including fossa of Rosenmuller  Oropharynx: no lesions or masses along posterior pharyngeal or lateral pharyngeal walls  Vallecula: no lesions or masses in the vallecula, no lingual tonsil hypertrophy  Epiglottis: crisp epiglottis without lesions  Aryepiglottic folds/arytenoids: left arytenoid paretic without movement, right arytenoid full motion  False vocal folds: no lesions, masses, or fullness of the false vocal folds  True vocal cords: paralyzed left cord in paramedian position, right cord fully mobile with patent airway, glottic gap with vocalization  No evidence of malignancy.      Pertinent Data:    Imaging:   CT neck 4/2021:  IMPRESSION:  1.  Postoperative changes of the neck without definite evidence to  suggest recurrent or metastatic disease.  2.  Patent left-sided carotid to vertebral artery anastomosis.    CT Soft Tissue Neck W Contrast 4/2022  HISTORY: Advanced head and neck cancer, follow-up     COMPARISON: CT neck dated 4/23/2021     TECHNIQUE:   Axial CT images of the neck were obtained after intravenous contrast.  Reformatted images in the sagittal and coronal plane were included.  Automatic exposure control was utilized to limit radiation dose.  DLP: 403 mGy-cm     FINDINGS:     There are stable postoperative changes of the oral cavity and neck.  There is no new or progressive nodular enhancement to suggest  recurrence. There are secretions layering in the left piriform sinus.  The airways are otherwise patent. There is no cervical  lymphadenopathy.     The parotid glands and thyroid gland are unremarkable. The  submandibular glands have been surgically resected. There is a patent  left carotid to vertebral artery grafting. There is no acute  abnormality of the orbits or visualized brain parenchyma. There is a  small  amount of fluid layering in the right maxillary sinus. There is  no destructive bone lesion identified. There is stable scarring in the  right lung apex.     IMPRESSION:  Stable exam without evidence of new or progressive disease.    CXR 5/17/22:   EXAMINATION:  XR CHEST 1 VIEW     CLINICAL HISTORY:  , Paralysis of vocal cords and larynx, unspecified.     FINDINGS:  Examination reveals mediastinal and cardiac silhouettes to be within normal limits. Lung fields are clear and free of gross infiltrates atelectases or effusions     Impression:     No active pulmonary disease    Procedure:  Patient with skin lesion.  We discussed the risks benefits and alternatives of biopsy.  Risks include pain infection bleeding recurrence need for further procedure nerve injury hematoma seroma.  Patient understands risks and consents to procedure.  Area cleaned and prepped in a sterile manner. 1cc lidocaine with 1% epinephrine was injected along lesion site.  A 4mm punch biopsy was used along the lesion site to obtain biopsy.  An iris scissor in forceps were used to excise lesion.  Hemostasis was achieved pressure.  A 4-0 plain gut was used to reapproximate skin edges for hemostasis.  Patient tolerated procedure.      Assessment/Plan:  Luz Elena Owens is a 69 y.o. female h/o [id number] SCC oral cavity s/p CRT 2012. Recurrent s/p FOM WLE/L MRND- sacrifice of carotid artery (vertebral bypass), CN X, XI, XII/PMMF 5/2017; left TVC in paramedian position with 2 prior injections, not interested in more. Not interested in thyroplasty after discussing with Dr. Anne. PEG dependent 2/2 aspirations. CONSUELO today, overall doing well.    TSH from last year appears to be slightly decreased however T4 is normal so we will continue her on this dosage  Nonhealing left helical lesion, biopsy today    - Continue daily synthroid  - Follow up in 1 week over telemedicine to review pathology results.  Call her neighbor with results  - otherwise we will do  routine surveillance in 1 year    I. Ginny Garcia MD  [facility] Department of Otolaryngology  [facility]

## 2024-07-29 ENCOUNTER — HOSPITAL ENCOUNTER (OUTPATIENT)
Dept: RADIOLOGY | Facility: HOSPITAL | Age: 69
Discharge: HOME | End: 2024-07-29
Attending: NURSE PRACTITIONER
Payer: MEDICARE

## 2024-07-29 DIAGNOSIS — R91.1 SOLITARY PULMONARY NODULE: ICD-10-CM

## 2024-07-29 PROCEDURE — 71250 CT THORAX DX C-: CPT | Mod: TC

## 2024-08-07 ENCOUNTER — CLINICAL SUPPORT (OUTPATIENT)
Dept: OTOLARYNGOLOGY | Facility: CLINIC | Age: 69
End: 2024-08-07
Payer: MEDICARE

## 2024-08-07 DIAGNOSIS — Z85.89 HISTORY OF SQUAMOUS CELL CARCINOMA: ICD-10-CM

## 2024-08-07 DIAGNOSIS — Z85.89 HISTORY OF HEAD AND NECK CANCER: Primary | ICD-10-CM

## 2024-08-07 DIAGNOSIS — H93.90 EAR LESION: ICD-10-CM

## 2024-08-14 ENCOUNTER — CLINICAL SUPPORT (OUTPATIENT)
Dept: OTOLARYNGOLOGY | Facility: CLINIC | Age: 69
End: 2024-08-14
Payer: MEDICARE

## 2024-08-14 DIAGNOSIS — Z85.89 HISTORY OF SQUAMOUS CELL CARCINOMA: ICD-10-CM

## 2024-08-14 DIAGNOSIS — Z85.89 HISTORY OF HEAD AND NECK CANCER: Primary | ICD-10-CM

## 2024-08-14 NOTE — PROGRESS NOTES
Established Patient - Audio Only Telehealth Visit     The patient location is: Home  The chief complaint leading to consultation is:  Biopsy results  Visit type: Virtual visit with audio only (telephone)  Total time spent with patient:  5 minutes        The reason for the audio only service rather than synchronous audio and video virtual visit was related to technical difficulties or patient preference/necessity.     Each patient to whom I provide medical services by telemedicine is:  (1) informed of the relationship between the physician and patient and the respective role of any other health care provider with respect to management of the patient; and (2) notified that they may decline to receive medical services by telemedicine and may withdraw from such care at any time. Patient verbally consented to receive this service via voice-only telephone call.       HPI:  Patient returns for follow-up on her biopsy.  Patient reports it is completely healed.    Skin lesion, left ear, punch biopsy:   -Benign skin with reactive changes and an overlying impetiginized hemorrhagic serum crust, see comment.   -Negative for malignancy.  -Multiple levels examined.     Assessment and plan:    Luz Elena Owens is a 69 y.o. female h/o [id number] SCC oral cavity s/p CRT 2012. Recurrent s/p FOM WLE/L MRND- sacrifice of carotid artery (vertebral bypass), CN X, XI, XII/PMMF 5/2017; left TVC in paramedian position with 2 prior injections, not interested in more. Not interested in thyroplasty after discussing with Dr. Anne. PEG dependent 2/2 aspirations. CONSUELO today, overall doing well.    TSH from last year appears to be slightly decreased however T4 is normal so we will continue her on this dosage  Nonhealing left helical lesion, biopsy last week.     Biopsy negative for malignancy.  Discussed that irritation a nonhealing ulcer may be due to sun exposure.  We discussed using Aquaphor and sunscreen and sun protection.  Return to clinic in 1  year for yearly surveillance     I. Ginny Garcia MD  [facility] Department of Otolaryngology  HO[facility]                     This service was not originating from a related E/M service provided within the previous 7 days nor will  to an E/M service or procedure within the next 24 hours or my soonest available appointment.  Prevailing standard of care was able to be met in this audio-only visit.

## 2024-11-07 ENCOUNTER — HOSPITAL ENCOUNTER (OUTPATIENT)
Dept: RADIOLOGY | Facility: HOSPITAL | Age: 69
Discharge: HOME | End: 2024-11-07
Attending: INTERNAL MEDICINE
Payer: MEDICARE

## 2024-11-07 DIAGNOSIS — R91.1 LUNG NODULE: ICD-10-CM

## 2024-11-07 PROCEDURE — 71250 CT THORAX DX C-: CPT | Mod: TC

## 2025-05-07 ENCOUNTER — HOSPITAL ENCOUNTER (OUTPATIENT)
Dept: RADIOLOGY | Facility: HOSPITAL | Age: 70
Discharge: HOME | End: 2025-05-07
Attending: INTERNAL MEDICINE
Payer: MEDICARE

## 2025-05-07 DIAGNOSIS — R91.1 LUNG NODULE: ICD-10-CM

## 2025-05-07 PROCEDURE — 71250 CT THORAX DX C-: CPT | Mod: TC

## 2025-08-07 RX ORDER — LEVOTHYROXINE SODIUM 100 UG/1
100 TABLET ORAL DAILY
Qty: 90 TABLET | Refills: 4 | Status: SHIPPED | OUTPATIENT
Start: 2025-08-07

## 2025-08-19 ENCOUNTER — OFFICE VISIT (OUTPATIENT)
Dept: OTOLARYNGOLOGY | Facility: CLINIC | Age: 70
End: 2025-08-19
Payer: MEDICARE

## 2025-08-19 VITALS
RESPIRATION RATE: 18 BRPM | WEIGHT: 104.94 LBS | HEIGHT: 65 IN | BODY MASS INDEX: 17.48 KG/M2 | TEMPERATURE: 98 F | SYSTOLIC BLOOD PRESSURE: 120 MMHG | OXYGEN SATURATION: 97 % | DIASTOLIC BLOOD PRESSURE: 61 MMHG | HEART RATE: 72 BPM

## 2025-08-19 DIAGNOSIS — Z85.89 HISTORY OF HEAD AND NECK CANCER: ICD-10-CM

## 2025-08-19 DIAGNOSIS — Z85.89 HISTORY OF SQUAMOUS CELL CARCINOMA: Primary | ICD-10-CM

## 2025-08-19 DIAGNOSIS — S01.80XA OPEN WOUND OF FOREHEAD, INITIAL ENCOUNTER: ICD-10-CM

## 2025-08-19 DIAGNOSIS — E89.0 HYPOTHYROIDISM DUE TO RADIATION: ICD-10-CM

## 2025-08-19 PROCEDURE — 11104 PUNCH BX SKIN SINGLE LESION: CPT | Mod: PBBFAC

## 2025-08-19 PROCEDURE — 99213 OFFICE O/P EST LOW 20 MIN: CPT | Mod: PBBFAC

## 2025-08-19 RX ORDER — LIDOCAINE HYDROCHLORIDE AND EPINEPHRINE BITARTRATE 20; .01 MG/ML; MG/ML
1.7 INJECTION, SOLUTION SUBCUTANEOUS ONCE
Status: COMPLETED | OUTPATIENT
Start: 2025-08-19 | End: 2025-08-19

## 2025-08-19 RX ADMIN — LIDOCAINE HYDROCHLORIDE AND EPINEPHRINE BITARTRATE 1.7 ML: 20; .01 INJECTION, SOLUTION SUBCUTANEOUS at 03:08

## 2025-08-20 LAB
ESTROGEN SERPL-MCNC: NORMAL PG/ML
INSULIN SERPL-ACNC: NORMAL U[IU]/ML
LAB AP GROSS DESCRIPTION: NORMAL
LAB AP REPORT FOOTNOTES: NORMAL

## 2025-08-27 ENCOUNTER — CLINICAL SUPPORT (OUTPATIENT)
Dept: OTOLARYNGOLOGY | Facility: CLINIC | Age: 70
End: 2025-08-27
Payer: MEDICARE

## 2025-08-27 DIAGNOSIS — Z85.89 HISTORY OF SQUAMOUS CELL CARCINOMA: Primary | ICD-10-CM
